# Patient Record
Sex: FEMALE | Race: BLACK OR AFRICAN AMERICAN | HISPANIC OR LATINO | Employment: OTHER | URBAN - METROPOLITAN AREA
[De-identification: names, ages, dates, MRNs, and addresses within clinical notes are randomized per-mention and may not be internally consistent; named-entity substitution may affect disease eponyms.]

---

## 2022-07-18 ENCOUNTER — OFFICE VISIT (OUTPATIENT)
Dept: INTERNAL MEDICINE CLINIC | Facility: CLINIC | Age: 47
End: 2022-07-18

## 2022-07-18 ENCOUNTER — APPOINTMENT (OUTPATIENT)
Dept: LAB | Facility: CLINIC | Age: 47
End: 2022-07-18

## 2022-07-18 VITALS
HEIGHT: 66 IN | TEMPERATURE: 97.1 F | DIASTOLIC BLOOD PRESSURE: 82 MMHG | HEART RATE: 72 BPM | OXYGEN SATURATION: 99 % | WEIGHT: 224.6 LBS | BODY MASS INDEX: 36.1 KG/M2 | SYSTOLIC BLOOD PRESSURE: 120 MMHG

## 2022-07-18 DIAGNOSIS — N83.201 RIGHT OVARIAN CYST: ICD-10-CM

## 2022-07-18 DIAGNOSIS — Z00.00 ANNUAL PHYSICAL EXAM: Primary | ICD-10-CM

## 2022-07-18 DIAGNOSIS — K43.9 VENTRAL HERNIA WITHOUT OBSTRUCTION OR GANGRENE: ICD-10-CM

## 2022-07-18 DIAGNOSIS — R60.9 DEPENDENT EDEMA: ICD-10-CM

## 2022-07-18 DIAGNOSIS — Z00.00 ANNUAL PHYSICAL EXAM: ICD-10-CM

## 2022-07-18 PROBLEM — Z11.59 NEED FOR HEPATITIS C SCREENING TEST: Status: ACTIVE | Noted: 2022-07-18

## 2022-07-18 LAB
EST. AVERAGE GLUCOSE BLD GHB EST-MCNC: 154 MG/DL
HBA1C MFR BLD: 7 %
TSH SERPL DL<=0.05 MIU/L-ACNC: 1.33 UIU/ML (ref 0.45–4.5)

## 2022-07-18 PROCEDURE — 84443 ASSAY THYROID STIM HORMONE: CPT

## 2022-07-18 PROCEDURE — 36415 COLL VENOUS BLD VENIPUNCTURE: CPT

## 2022-07-18 PROCEDURE — 83036 HEMOGLOBIN GLYCOSYLATED A1C: CPT

## 2022-07-18 PROCEDURE — 99204 OFFICE O/P NEW MOD 45 MIN: CPT | Performed by: FAMILY MEDICINE

## 2022-07-18 NOTE — PATIENT INSTRUCTIONS

## 2022-07-18 NOTE — PROGRESS NOTES
ADULT ANNUAL [address]     NAME: Mychal Bowers  AGE: 51 y o  SEX: female  : 1975     DATE: 2022     Assessment and Plan:     Problem List Items Addressed This Visit    None     Visit Diagnoses     Annual physical exam    -  Primary    Relevant Orders    HEMOGLOBIN A1C W/ EAG ESTIMATION    TSH, 3rd generation with Free T4 reflex    Ventral hernia without obstruction or gangrene        Right ovarian cyst        Dependent edema            50-year-old female due for multiple screening studies and follow up care for ovarian cyst and ventral hernia  Limited medical care due to limited  medical insurance coverage  Patient encouraged to apply for [place] medical assistance  Until then will follow-up on elevated blood sugars and thyroid dyscrasia rule out etiology for lower extremity swelling LE  likely due to dependent edema  Encouraged patient to elevate legs at the end of work day and wear compression stockings as well  Counseling:  Dental Health: discussed importance of regular tooth brushing, flossing, and dental visits  · Exercise: the importance of regular exercise/physical activity was discussed  Recommend exercise 3-5 times per week for at least 30 minutes  No follow-ups on file  Chief Complaint:     Chief Complaint   Patient presents with    Physical Exam     Pt is here to establish care and also has swelling on both of her feet since 2 days and off and off      History of Present Illness:     Adult Annual Physical   Patient here for a comprehensive physical exam   Presents to establish care  Denies chronic medical history  Does report ER visit last year with a CT scan that shows ovarian cyst and a ventral hernia  Patient was unable to follow-up concerning things due to lack of medical insurance  She also notes having a history of elevated blood sugars  Would like this checked      Works in housekeeping   Lives with  and kids  Denies family hx of breast cancer or colon cancer     Diet and Physical Activity  · Diet/Nutrition: poor diet  · Exercise: no formal exercise  Depression Screening  PHQ-2/9 Depression Screening    Little interest or pleasure in doing things: 0 - not at all  Feeling down, depressed, or hopeless: 0 - not at all  PHQ-2 Score: 0  PHQ-2 Interpretation: Negative depression screen       General Health  · Sleep: gets 4-6 hours of sleep on average  · Hearing: normal - bilateral   · Vision: no vision problems  · Dental: no dental visits for >1 year  /GYN Health  · Patient is: premenopausal  · Last menstrual period: 2021  · Contraceptive method: none   · Last mammoo- never  Last pap- years     Review of Systems:     Review of Systems   Eyes: Negative for visual disturbance  Respiratory: Negative for shortness of breath  Cardiovascular: Positive for leg swelling (resolved by morning)  Negative for chest pain  Gastrointestinal: Negative for constipation and diarrhea  Musculoskeletal: Negative for gait problem  Skin: Negative for rash  Allergic/Immunologic: Negative for environmental allergies  Past Medical History:     Past Medical History:   Diagnosis Date    gallbladder       Past Surgical History:     Past Surgical History:   Procedure Laterality Date     SECTION      GALLBLADDER SURGERY        Social History:     Social History     Socioeconomic History    Marital status: /Civil Union     Spouse name: None    Number of children: None    Years of education: None    Highest education level: None   Occupational History    None   Tobacco Use    Smoking status: Never Smoker    Smokeless tobacco: None   Vaping Use    Vaping Use: Never used   Substance and Sexual Activity    Alcohol use:  Yes     Alcohol/week: 1 0 standard drink     Types: 1 Glasses of wine per week     Comment: social    Drug use: None    Sexual activity: None   Other Topics Concern    None   Social History Narrative    None     Social Determinants of Health     Financial Resource Strain: Not on file   Food Insecurity: Not on file   Transportation Needs: Not on file   Physical Activity: Not on file   Stress: Not on file   Social Connections: Not on file   Intimate Partner Violence: Not on file   Housing Stability: Not on file      Family History:     Family History   Problem Relation Age of Onset    Diabetes Sister       Current Medications:     No current outpatient medications on file  No current facility-administered medications for this visit  Allergies:     No Known Allergies   Physical Exam:     /82 (BP Location: Left arm, Patient Position: Sitting, Cuff Size: Standard)   Pulse 72   Temp (!) 97 1 °F (36 2 °C) (Temporal)   Ht 5' 6" (1 676 m)   Wt 102 kg (224 lb 9 6 oz)   SpO2 99%   BMI 36 25 kg/m²     Physical Exam  Constitutional:       Appearance: She is obese  HENT:      Head: Normocephalic and atraumatic  Right Ear: Tympanic membrane and external ear normal       Left Ear: Tympanic membrane and external ear normal    Eyes:      Extraocular Movements: Extraocular movements intact  Conjunctiva/sclera: Conjunctivae normal       Pupils: Pupils are equal, round, and reactive to light  Cardiovascular:      Rate and Rhythm: Normal rate and regular rhythm  Heart sounds: No murmur heard  Pulmonary:      Effort: Pulmonary effort is normal       Breath sounds: Normal breath sounds  Abdominal:      General: Bowel sounds are normal       Palpations: Abdomen is soft  Musculoskeletal:      Right lower leg: No edema  Left lower leg: No edema  Neurological:      Mental Status: She is alert and oriented to person, place, and time        Gait: Gait normal    Psychiatric:         Mood and Affect: Mood normal          Behavior: Behavior normal           Dianne Cooney,   MEDICAL [id number]

## 2022-07-19 ENCOUNTER — TELEPHONE (OUTPATIENT)
Dept: INTERNAL MEDICINE CLINIC | Facility: CLINIC | Age: 47
End: 2022-07-19

## 2022-07-19 DIAGNOSIS — E11.9 TYPE 2 DIABETES MELLITUS WITHOUT COMPLICATION, WITHOUT LONG-TERM CURRENT USE OF INSULIN (HCC): Primary | ICD-10-CM

## 2022-07-19 NOTE — TELEPHONE ENCOUNTER
I spoke to pt she is aware of her results she also would like to know that when she had a high sugar she was able to control it on her own she would like to know if this is ok with you before she starts this with out medicine and she is also going to buy a glucometer to check her sugar at home patient needs advise on what to do

## 2022-07-19 NOTE — TELEPHONE ENCOUNTER
----- Message from Kimmy Galan DO sent at 7/19/2022  9:35 AM EDT -----  Please notify patient that her thyroid function is normal however her A1c is 7 0  This means that she does have diabetes  I do recommend starting medication like metformin 500 mg once a day  This medication is cheap specially obtained at [address] [facility] [place]    Please see if patient is amendable to starting medication

## 2022-07-19 NOTE — TELEPHONE ENCOUNTER
Please notify patient it is feasible to try lifestyle modification  as a way to manage her diabetes  Will recheck A1c in 3 months to ensure that it has improved   if not I would strongly recommend starting metformin therapy

## 2022-07-20 ENCOUNTER — TELEPHONE (OUTPATIENT)
Dept: INTERNAL MEDICINE CLINIC | Facility: CLINIC | Age: 47
End: 2022-07-20

## 2022-07-20 RX ORDER — METFORMIN HYDROCHLORIDE 500 MG/1
500 TABLET, EXTENDED RELEASE ORAL
Qty: 30 TABLET | Refills: 2 | Status: SHIPPED | OUTPATIENT
Start: 2022-07-20

## 2022-07-20 NOTE — TELEPHONE ENCOUNTER
Pt would like to start metformin rx  Please send rx to [address] in [address] [place] [address], call back upon completion

## 2022-10-11 PROBLEM — Z11.59 NEED FOR HEPATITIS C SCREENING TEST: Status: RESOLVED | Noted: 2022-07-18 | Resolved: 2022-10-11

## 2022-11-01 DIAGNOSIS — E11.9 TYPE 2 DIABETES MELLITUS WITHOUT COMPLICATION, WITHOUT LONG-TERM CURRENT USE OF INSULIN (HCC): ICD-10-CM

## 2022-11-01 RX ORDER — METFORMIN HYDROCHLORIDE 500 MG/1
500 TABLET, EXTENDED RELEASE ORAL
Qty: 30 TABLET | Refills: 2 | Status: SHIPPED | OUTPATIENT
Start: 2022-11-01

## 2022-12-28 DIAGNOSIS — E11.9 TYPE 2 DIABETES MELLITUS WITHOUT COMPLICATION, WITHOUT LONG-TERM CURRENT USE OF INSULIN (HCC): ICD-10-CM

## 2022-12-28 RX ORDER — METFORMIN HYDROCHLORIDE 500 MG/1
TABLET, EXTENDED RELEASE ORAL
Qty: 30 TABLET | Refills: 2 | Status: SHIPPED | OUTPATIENT
Start: 2022-12-28

## 2023-05-08 DIAGNOSIS — E11.9 TYPE 2 DIABETES MELLITUS WITHOUT COMPLICATION, WITHOUT LONG-TERM CURRENT USE OF INSULIN (HCC): ICD-10-CM

## 2023-05-08 RX ORDER — METFORMIN HYDROCHLORIDE 500 MG/1
TABLET, EXTENDED RELEASE ORAL
Qty: 30 TABLET | Refills: 0 | Status: SHIPPED | OUTPATIENT
Start: 2023-05-08

## 2023-07-16 ENCOUNTER — TELEPHONE (OUTPATIENT)
Dept: OTHER | Facility: OTHER | Age: 48
End: 2023-07-16

## 2023-08-13 DIAGNOSIS — E11.9 TYPE 2 DIABETES MELLITUS WITHOUT COMPLICATION, WITHOUT LONG-TERM CURRENT USE OF INSULIN (HCC): ICD-10-CM

## 2023-08-14 RX ORDER — METFORMIN HYDROCHLORIDE 500 MG/1
TABLET, EXTENDED RELEASE ORAL
Qty: 30 TABLET | Refills: 0 | Status: SHIPPED | OUTPATIENT
Start: 2023-08-14

## 2023-08-28 ENCOUNTER — TELEPHONE (OUTPATIENT)
Age: 48
End: 2023-08-28

## 2023-08-28 NOTE — TELEPHONE ENCOUNTER
Spoke with patient regarding her diabetic eye exam. Patient is not sure what the name of her office is and will look it up and ask them to send over results to our office.

## 2023-09-15 DIAGNOSIS — E11.9 TYPE 2 DIABETES MELLITUS WITHOUT COMPLICATION, WITHOUT LONG-TERM CURRENT USE OF INSULIN (HCC): ICD-10-CM

## 2023-09-15 RX ORDER — METFORMIN HYDROCHLORIDE 500 MG/1
TABLET, EXTENDED RELEASE ORAL
Qty: 90 TABLET | Refills: 1 | OUTPATIENT
Start: 2023-09-15

## 2024-02-19 ENCOUNTER — TELEPHONE (OUTPATIENT)
Age: 49
End: 2024-02-19

## 2024-02-19 NOTE — TELEPHONE ENCOUNTER
Left detailed voice mssg for pt to give the office a call back to schedule follow up appt/ annual appt. She is also due for iris exam per quality request.

## 2024-02-20 ENCOUNTER — TELEPHONE (OUTPATIENT)
Age: 49
End: 2024-02-20

## 2024-08-05 ENCOUNTER — OFFICE VISIT (OUTPATIENT)
Age: 49
End: 2024-08-05

## 2024-08-05 ENCOUNTER — APPOINTMENT (OUTPATIENT)
Age: 49
End: 2024-08-05
Payer: COMMERCIAL

## 2024-08-05 VITALS
BODY MASS INDEX: 36.45 KG/M2 | SYSTOLIC BLOOD PRESSURE: 132 MMHG | HEART RATE: 75 BPM | DIASTOLIC BLOOD PRESSURE: 86 MMHG | HEIGHT: 66 IN | WEIGHT: 226.8 LBS | OXYGEN SATURATION: 98 % | TEMPERATURE: 97.8 F | RESPIRATION RATE: 16 BRPM

## 2024-08-05 DIAGNOSIS — Z12.11 SCREENING FOR COLON CANCER: ICD-10-CM

## 2024-08-05 DIAGNOSIS — E66.09 CLASS 2 OBESITY DUE TO EXCESS CALORIES WITHOUT SERIOUS COMORBIDITY WITH BODY MASS INDEX (BMI) OF 36.0 TO 36.9 IN ADULT: ICD-10-CM

## 2024-08-05 DIAGNOSIS — R03.0 ELEVATED BP WITHOUT DIAGNOSIS OF HYPERTENSION: ICD-10-CM

## 2024-08-05 DIAGNOSIS — R73.01 IMPAIRED FASTING GLUCOSE: ICD-10-CM

## 2024-08-05 DIAGNOSIS — Z59.9 FINANCIAL DIFFICULTIES: ICD-10-CM

## 2024-08-05 DIAGNOSIS — Z00.00 ENCOUNTER FOR ANNUAL PHYSICAL EXAM: Primary | ICD-10-CM

## 2024-08-05 DIAGNOSIS — B35.1 ONYCHOMYCOSIS OF LEFT GREAT TOE: ICD-10-CM

## 2024-08-05 DIAGNOSIS — Z12.31 ENCOUNTER FOR SCREENING MAMMOGRAM FOR BREAST CANCER: ICD-10-CM

## 2024-08-05 DIAGNOSIS — R53.83 FATIGUE, UNSPECIFIED TYPE: ICD-10-CM

## 2024-08-05 LAB
ANION GAP SERPL CALCULATED.3IONS-SCNC: 10 MMOL/L (ref 4–13)
BUN SERPL-MCNC: 11 MG/DL (ref 5–25)
CALCIUM SERPL-MCNC: 8.8 MG/DL (ref 8.4–10.2)
CHLORIDE SERPL-SCNC: 103 MMOL/L (ref 96–108)
CHOLEST SERPL-MCNC: 159 MG/DL
CO2 SERPL-SCNC: 26 MMOL/L (ref 21–32)
CREAT SERPL-MCNC: 0.76 MG/DL (ref 0.6–1.3)
EST. AVERAGE GLUCOSE BLD GHB EST-MCNC: 212 MG/DL
GFR SERPL CREATININE-BSD FRML MDRD: 92 ML/MIN/1.73SQ M
GLUCOSE P FAST SERPL-MCNC: 134 MG/DL (ref 65–99)
HBA1C MFR BLD: 9 %
HDLC SERPL-MCNC: 57 MG/DL
LDLC SERPL CALC-MCNC: 76 MG/DL (ref 0–100)
NONHDLC SERPL-MCNC: 102 MG/DL
POTASSIUM SERPL-SCNC: 3.6 MMOL/L (ref 3.5–5.3)
SODIUM SERPL-SCNC: 139 MMOL/L (ref 135–147)
TRIGL SERPL-MCNC: 130 MG/DL

## 2024-08-05 PROCEDURE — 83036 HEMOGLOBIN GLYCOSYLATED A1C: CPT

## 2024-08-05 PROCEDURE — 80061 LIPID PANEL: CPT

## 2024-08-05 PROCEDURE — 99204 OFFICE O/P NEW MOD 45 MIN: CPT | Performed by: STUDENT IN AN ORGANIZED HEALTH CARE EDUCATION/TRAINING PROGRAM

## 2024-08-05 PROCEDURE — 80048 BASIC METABOLIC PNL TOTAL CA: CPT

## 2024-08-05 PROCEDURE — 36415 COLL VENOUS BLD VENIPUNCTURE: CPT

## 2024-08-05 RX ORDER — METFORMIN HYDROCHLORIDE 500 MG/1
500 TABLET, EXTENDED RELEASE ORAL
Qty: 30 TABLET | Refills: 1 | Status: SHIPPED | OUTPATIENT
Start: 2024-08-05 | End: 2024-08-06

## 2024-08-05 RX ORDER — CETIRIZINE HYDROCHLORIDE 10 MG/1
10 TABLET ORAL DAILY
COMMUNITY
Start: 2024-02-08 | End: 2024-08-05 | Stop reason: ALTCHOICE

## 2024-08-05 SDOH — ECONOMIC STABILITY - INCOME SECURITY: PROBLEM RELATED TO HOUSING AND ECONOMIC CIRCUMSTANCES, UNSPECIFIED: Z59.9

## 2024-08-05 NOTE — PATIENT INSTRUCTIONS
Please check your blood pressure every day/every other day. If you are consistently above 130/80 please give me a call to discuss starting medication.    Please get your lab work done.

## 2024-08-05 NOTE — PROGRESS NOTES
Adult Annual Physical  Name: Rizwana Little      : 1975      MRN: [id number]  Encounter Provider: Tong Jaime MD  Encounter Date: 2024   Encounter department: formerly [facility] [place]    Assessment & Plan   1. Encounter for annual physical exam  Immunizations: Deferred at this time due to patient preference  Labs: Lab orders limited 2/2 to lack of insurance coverage; will check BMP, A1c, lipids - patient will let us know if she obtains insurance coverage to add on additional labs  Screening: Referral to GI for colonoscopy; mammogram ordered - patient to defer until insurance coverage; deferred Hep C and HIV screening at this time 2/2 cost    2. Impaired fasting glucose  Patient had A1c of 7.0 2 years ago and was given T2DM diagnosis. However, she only met criteria with one lab. Fasting glucose at that time in prediabetic range.  Recheck FBG, A1c  Restart metformin 500 mg qd  -     Basic metabolic panel; Future  -     metFORMIN (GLUCOPHAGE-XR) 500 mg 24 hr tablet; Take 1 tablet (500 mg total) by mouth daily with dinner  -     Hemoglobin A1C; Future    3. Elevated BP without diagnosis of hypertension  /86 without symptoms.  Instructed patient to check BP every day/every other day for next 2 weeks and call back with results, especially if consistently above 130/80    4. Class 2 obesity due to excess calories without serious comorbidity with body mass index (BMI) of 36.0 to 36.9 in adult  -     Lipid panel; Future    5. Onychomycosis of left great toe  Can continue topical OTC antifungal    6. Encounter for screening mammogram for breast cancer  -     Mammo screening bilateral w 3d & cad; Future; Expected date: 2024    7. Screening for colon cancer  -     Ambulatory Referral to Gastroenterology; Future    8. Financial difficulties  Patient working on insurance application. Will defer additional workup until insurance coverage.    9. Fatigue, unspecified type  STOP BANG 3  with concern for MARITA based on history of symptoms.  When insurance coverage obtained, will plan to check CBC, TSH and obtain sleep study    Immunizations and preventive care screenings were discussed with patient today. Appropriate education was printed on patient's after visit summary.    Counseling:  Alcohol/drug use: discussed moderation in alcohol intake, the recommendations for healthy alcohol use, and avoidance of illicit drug use.  Dental Health: discussed importance of regular tooth brushing, flossing, and dental visits.  Injury prevention: discussed safety/seat belts, safety helmets, smoke detectors, carbon dioxide detectors, and smoking near bedding or upholstery.  Sexual health: discussed sexually transmitted diseases, partner selection, use of condoms, avoidance of unintended pregnancy, and contraceptive alternatives.  Exercise: the importance of regular exercise/physical activity was discussed. Recommend exercise 3-5 times per week for at least 30 minutes.       Depression Screening and Follow-up Plan: Patient was screened for depression during today's encounter. They screened negative with a PHQ-2 score of 0.        History of Present Illness       Rizwana Little is a 47 yo F with PMH of impaired fasting glucose who presents today for annual physical.  She was last seen in our office 2 years ago.  At that time she had an A1c done which returned at 7.0.  She did not meet any other criteria for diagnosis of diabetes though she was told at times that she had type 2 diabetes and was started on metformin.  At that time, patient did not have insurance and so her workup was limited.  Due to lack of insurance, she has not followed up and has been out of metformin for some time.  She presents today again as self-pay and is in the process of trying to get her health insurance approved.  Given lack of insurance coverage, workup limited again at this time.  She denies any new complaints though upon further  "questioning it is noted that she only sleeps about 3 to 4 hours per night with frequent awakenings.  She does snore but her  who is present in the room denies any witnessed apnea.  She feels unrested in the morning and often wakes up with morning headache.STOP BANG 3.    Adult Annual Physical:  Patient presents for annual physical.     Diet and Physical Activity:  - Diet/Nutrition:. \"So-so\" diet but trying to cut down on sugar intake and fatty foods  - Exercise: walking and 5-7 times a week on average.    Depression Screening:  - PHQ-2 Score: 0    General Health:  - Sleep: sleeps poorly. 3-4 hours of sleep on average  - Hearing: normal hearing right ear and normal hearing left ear.  - Vision: goes for regular eye exams and wears glasses and contacts.  - Dental: no dental visits for > 1 year and brushes teeth once daily.    /GYN Health:  - Follows with GYN: no.   - Menopause: premenopausal.   - Last menstrual cycle: 8/3/2024.   - History of STDs: no  - Contraception:. Not sexually active      Advanced Care Planning:  - Has an advanced directive?: no    - Has a durable medical POA?: no    - ACP document given to patient?: no      Review of Systems   Constitutional:  Positive for fatigue. Negative for chills and fever.   HENT:  Negative for congestion and sore throat.    Eyes:  Negative for visual disturbance.   Respiratory:  Negative for cough and shortness of breath.    Cardiovascular:  Negative for chest pain and leg swelling.   Gastrointestinal:  Negative for abdominal pain, constipation, diarrhea and nausea.   Genitourinary:  Negative for dysuria.   Musculoskeletal:  Negative for arthralgias and myalgias.   Neurological:  Negative for dizziness, light-headedness and headaches.   Psychiatric/Behavioral:  Positive for sleep disturbance. Negative for confusion.      Medical History Reviewed by provider this encounter:       Past Medical History   Past Medical History:   Diagnosis Date    gallbladder  " "    Past Surgical History:   Procedure Laterality Date     SECTION      GALLBLADDER SURGERY       Family History   Problem Relation Age of Onset    Diabetes Sister      Current Outpatient Medications on File Prior to Visit   Medication Sig Dispense Refill    [DISCONTINUED] metFORMIN (GLUCOPHAGE-XR) 500 mg 24 hr tablet TAKE 1 TABLET BY MOUTH EVERY DAY WITH DINNER 30 tablet 0    [DISCONTINUED] cetirizine (ZyrTEC Allergy) 10 mg tablet Take 10 mg by mouth daily (Patient not taking: Reported on 2024)       No current facility-administered medications on file prior to visit.   No Known Allergies   Current Outpatient Medications on File Prior to Visit   Medication Sig Dispense Refill    [DISCONTINUED] metFORMIN (GLUCOPHAGE-XR) 500 mg 24 hr tablet TAKE 1 TABLET BY MOUTH EVERY DAY WITH DINNER 30 tablet 0    [DISCONTINUED] cetirizine (ZyrTEC Allergy) 10 mg tablet Take 10 mg by mouth daily (Patient not taking: Reported on 2024)       No current facility-administered medications on file prior to visit.      Social History     Tobacco Use    Smoking status: Never    Smokeless tobacco: Not on file   Vaping Use    Vaping status: Never Used   Substance and Sexual Activity    Alcohol use: Yes     Alcohol/week: 1.0 standard drink of alcohol     Types: 1 Glasses of wine per week     Comment: social    Drug use: Not on file    Sexual activity: Not on file       Objective     /86 (BP Location: Right arm, Patient Position: Sitting, Cuff Size: Large)   Pulse 75   Temp 97.8 °F (36.6 °C) (Tympanic)   Resp 16   Ht 5' 6\" (1.676 m)   Wt 103 kg (226 lb 12.8 oz)   SpO2 98%   BMI 36.61 kg/m²     Physical Exam  Constitutional:       General: She is not in acute distress.  HENT:      Right Ear: Tympanic membrane, ear canal and external ear normal.      Left Ear: Tympanic membrane, ear canal and external ear normal.      Nose: Nose normal.      Mouth/Throat:      Mouth: Mucous membranes are moist.      Pharynx: " Oropharynx is clear.   Eyes:      Conjunctiva/sclera: Conjunctivae normal.      Pupils: Pupils are equal, round, and reactive to light.   Neck:      Thyroid: No thyroid mass or thyroid tenderness.   Cardiovascular:      Rate and Rhythm: Normal rate and regular rhythm.      Heart sounds: Normal heart sounds.   Pulmonary:      Effort: Pulmonary effort is normal.      Breath sounds: Normal breath sounds.   Abdominal:      General: There is no distension.      Tenderness: There is no abdominal tenderness.   Musculoskeletal:      Right lower leg: No edema.      Left lower leg: No edema.   Feet:      Comments: Onychomycosis of left great toe  Lymphadenopathy:      Cervical: No cervical adenopathy.   Skin:     General: Skin is warm and dry.   Neurological:      Mental Status: She is alert.      Sensory: Sensation is intact.      Motor: Motor function is intact.      Gait: Gait is intact.   Psychiatric:         Mood and Affect: Mood normal.         Speech: Speech normal.         Behavior: Behavior normal. Behavior is cooperative.

## 2024-08-06 DIAGNOSIS — E11.9 TYPE 2 DIABETES MELLITUS WITHOUT COMPLICATION, WITHOUT LONG-TERM CURRENT USE OF INSULIN (HCC): Primary | ICD-10-CM

## 2024-08-06 DIAGNOSIS — R73.01 IMPAIRED FASTING GLUCOSE: ICD-10-CM

## 2024-08-06 RX ORDER — ATORVASTATIN CALCIUM 20 MG/1
20 TABLET, FILM COATED ORAL
Qty: 30 TABLET | Refills: 1 | Status: SHIPPED | OUTPATIENT
Start: 2024-08-06 | End: 2024-09-05

## 2024-08-06 RX ORDER — METFORMIN HYDROCHLORIDE 500 MG/1
500 TABLET, EXTENDED RELEASE ORAL 2 TIMES DAILY WITH MEALS
Qty: 30 TABLET | Refills: 1 | Status: SHIPPED | OUTPATIENT
Start: 2024-08-06

## 2024-08-08 ENCOUNTER — TELEPHONE (OUTPATIENT)
Age: 49
End: 2024-08-08

## 2024-08-08 NOTE — TELEPHONE ENCOUNTER
Spoke with patient. Discussed plan of care. She will continue metformin as we discussed. She will work on dietary modification. If in 1-2 weeks, she is not feeling well with high blood sugars, we may discuss addition of another oral medication. I would like her to come in sooner than her originally scheduled appointment. Will ask clerical to have her scheduled for 1 month follow up.

## 2024-08-08 NOTE — TELEPHONE ENCOUNTER
Pt called, has concerns of her Glucose readings  This morning Reading 299  Reading - Afternoon- 294  Has headaches and very low energy.    She wants to know what she can do to bring down the sugar .    Nurse line not available.    Pt is requesting a call back ASAP from Dr Jaime      Please advise. Thank you

## 2024-08-19 DIAGNOSIS — R73.01 IMPAIRED FASTING GLUCOSE: ICD-10-CM

## 2024-08-20 RX ORDER — METFORMIN HCL 500 MG
500 TABLET, EXTENDED RELEASE 24 HR ORAL 2 TIMES DAILY WITH MEALS
Qty: 180 TABLET | Refills: 1 | Status: SHIPPED | OUTPATIENT
Start: 2024-08-20

## 2024-08-22 ENCOUNTER — TELEPHONE (OUTPATIENT)
Age: 49
End: 2024-08-22

## 2024-08-22 NOTE — TELEPHONE ENCOUNTER
Pt. Called, had a missed call regarding scheduling colonoscopy, warm transfer to GI scheduling to better assist, call transferred to Miesha

## 2024-08-22 NOTE — TELEPHONE ENCOUNTER
Pt calling to sched colonoscopy. Will call back to sched, pt does not have insurance and would like to look into cost prior to sched proced. If too pricey, pt will wait until she gets insurance.

## 2024-09-03 DIAGNOSIS — E11.9 TYPE 2 DIABETES MELLITUS WITHOUT COMPLICATION, WITHOUT LONG-TERM CURRENT USE OF INSULIN (HCC): ICD-10-CM

## 2024-09-04 RX ORDER — ATORVASTATIN CALCIUM 20 MG/1
20 TABLET, FILM COATED ORAL
Qty: 90 TABLET | Refills: 1 | Status: SHIPPED | OUTPATIENT
Start: 2024-09-04 | End: 2024-09-06 | Stop reason: CLARIF

## 2024-09-06 ENCOUNTER — APPOINTMENT (OUTPATIENT)
Age: 49
End: 2024-09-06
Payer: COMMERCIAL

## 2024-09-06 ENCOUNTER — OFFICE VISIT (OUTPATIENT)
Age: 49
End: 2024-09-06

## 2024-09-06 VITALS
WEIGHT: 227.8 LBS | HEIGHT: 66 IN | TEMPERATURE: 97.2 F | DIASTOLIC BLOOD PRESSURE: 88 MMHG | RESPIRATION RATE: 16 BRPM | BODY MASS INDEX: 36.61 KG/M2 | HEART RATE: 71 BPM | SYSTOLIC BLOOD PRESSURE: 134 MMHG | OXYGEN SATURATION: 99 %

## 2024-09-06 DIAGNOSIS — I10 PRIMARY HYPERTENSION: ICD-10-CM

## 2024-09-06 DIAGNOSIS — E11.9 TYPE 2 DIABETES MELLITUS WITHOUT COMPLICATION, WITHOUT LONG-TERM CURRENT USE OF INSULIN (HCC): Primary | ICD-10-CM

## 2024-09-06 DIAGNOSIS — Z59.89 DOES NOT HAVE HEALTH INSURANCE: ICD-10-CM

## 2024-09-06 DIAGNOSIS — E11.9 TYPE 2 DIABETES MELLITUS WITHOUT COMPLICATION, WITHOUT LONG-TERM CURRENT USE OF INSULIN (HCC): ICD-10-CM

## 2024-09-06 PROBLEM — R03.0 ELEVATED BP WITHOUT DIAGNOSIS OF HYPERTENSION: Status: RESOLVED | Noted: 2024-08-05 | Resolved: 2024-09-06

## 2024-09-06 PROBLEM — R73.01 IMPAIRED FASTING GLUCOSE: Status: RESOLVED | Noted: 2022-07-19 | Resolved: 2024-09-06

## 2024-09-06 LAB
BASOPHILS # BLD AUTO: 0.06 THOUSANDS/ÂΜL (ref 0–0.1)
BASOPHILS NFR BLD AUTO: 1 % (ref 0–1)
EOSINOPHIL # BLD AUTO: 0.33 THOUSAND/ÂΜL (ref 0–0.61)
EOSINOPHIL NFR BLD AUTO: 3 % (ref 0–6)
ERYTHROCYTE [DISTWIDTH] IN BLOOD BY AUTOMATED COUNT: 14.7 % (ref 11.6–15.1)
HCT VFR BLD AUTO: 35.4 % (ref 34.8–46.1)
HGB BLD-MCNC: 10.7 G/DL (ref 11.5–15.4)
IMM GRANULOCYTES # BLD AUTO: 0.05 THOUSAND/UL (ref 0–0.2)
IMM GRANULOCYTES NFR BLD AUTO: 0 % (ref 0–2)
LYMPHOCYTES # BLD AUTO: 3.89 THOUSANDS/ÂΜL (ref 0.6–4.47)
LYMPHOCYTES NFR BLD AUTO: 33 % (ref 14–44)
MCH RBC QN AUTO: 25.2 PG (ref 26.8–34.3)
MCHC RBC AUTO-ENTMCNC: 30.2 G/DL (ref 31.4–37.4)
MCV RBC AUTO: 84 FL (ref 82–98)
MONOCYTES # BLD AUTO: 0.73 THOUSAND/ÂΜL (ref 0.17–1.22)
MONOCYTES NFR BLD AUTO: 6 % (ref 4–12)
NEUTROPHILS # BLD AUTO: 6.81 THOUSANDS/ÂΜL (ref 1.85–7.62)
NEUTS SEG NFR BLD AUTO: 57 % (ref 43–75)
NRBC BLD AUTO-RTO: 0 /100 WBCS
PLATELET # BLD AUTO: 291 THOUSANDS/UL (ref 149–390)
PMV BLD AUTO: 11.9 FL (ref 8.9–12.7)
RBC # BLD AUTO: 4.24 MILLION/UL (ref 3.81–5.12)
TSH SERPL DL<=0.05 MIU/L-ACNC: 1.3 UIU/ML (ref 0.45–4.5)
WBC # BLD AUTO: 11.87 THOUSAND/UL (ref 4.31–10.16)

## 2024-09-06 PROCEDURE — 99214 OFFICE O/P EST MOD 30 MIN: CPT | Performed by: STUDENT IN AN ORGANIZED HEALTH CARE EDUCATION/TRAINING PROGRAM

## 2024-09-06 PROCEDURE — 84443 ASSAY THYROID STIM HORMONE: CPT

## 2024-09-06 PROCEDURE — 85025 COMPLETE CBC W/AUTO DIFF WBC: CPT

## 2024-09-06 PROCEDURE — 36415 COLL VENOUS BLD VENIPUNCTURE: CPT

## 2024-09-06 RX ORDER — LISINOPRIL 10 MG/1
10 TABLET ORAL DAILY
Qty: 100 TABLET | Refills: 3 | Status: SHIPPED | OUTPATIENT
Start: 2024-09-06

## 2024-09-06 RX ORDER — DIPHENOXYLATE HYDROCHLORIDE AND ATROPINE SULFATE 2.5; .025 MG/1; MG/1
1 TABLET ORAL DAILY
COMMUNITY

## 2024-09-06 RX ORDER — ROSUVASTATIN CALCIUM 5 MG/1
5 TABLET, COATED ORAL DAILY
Qty: 30 TABLET | Refills: 5 | Status: SHIPPED | OUTPATIENT
Start: 2024-09-06

## 2024-09-06 SDOH — ECONOMIC STABILITY - INCOME SECURITY: OTHER PROBLEMS RELATED TO HOUSING AND ECONOMIC CIRCUMSTANCES: Z59.89

## 2024-09-06 NOTE — PROGRESS NOTES
Ambulatory Visit  Name: Rizwana Little      : 1975      MRN: [id number]  Encounter Provider: Tong Jaime MD  Encounter Date: 2024   Encounter department: [facility]IMARY C[facility] [place]    Assessment & Plan   1. Type 2 diabetes mellitus without complication, without long-term current use of insulin (HCC)  A1c recently 9.0. Started on metformin 500 mg BID, which she is tolerating well.  Continue metformin at current dose  Repeat A1c at follow up visit in 2 months  Foot exam completed today  Diabetic eye exam, diabetes education referral, and microalbumin/Cr ratio deferred today 2/2 patient lacking health insurance coverage  Complaining of muscle aching since starting atorvastatin - discontinue atorvastatin, start rosuvastatin 5 mg qd  -     TSH, 3rd generation with Free T4 reflex; Future  -     rosuvastatin (CRESTOR) 5 mg tablet; Take 1 tablet (5 mg total) by mouth daily    2. Primary hypertension  Reports readings at home as high as 150s.  Start lisinopril 10 mg qd  -     CBC and differential; Future  -     TSH, 3rd generation with Free T4 reflex; Future  -     lisinopril (ZESTRIL) 10 mg tablet; Take 1 tablet (10 mg total) by mouth daily    3. Does not have health insurance  After discussion with patient, the following to be deferred until after health insurance coverage obtained: diabetic eye exam, diabetes education referral, microalbumin/Cr ratio, mammogram, colonoscopy, cervical cancer screening, HIV screening, hep C screening.   Mammogram and GI referral ordered at last office visit.      Depression Screening and Follow-up Plan: Patient was screened for depression during today's encounter. They screened negative with a PHQ-2 score of 0.      History of Present Illness     Rizwana Little is a 47 yo F with PMH of recently diagnosed T2DM who presents today for follow up. Her recent labs showed A1c 9.0. She was started on metformin 500 mg BID as well as moderate intensity statin.  Full lab panel was deferred as she is still awaiting insurance coverage. She reports she has been tolerating metformin well. She has noticed some muscle aching however, around the same time that she started atorvastatin. She was checking her BP at home sporadically but states she gets readings into the SBP 150s. She was ordered mammogram and GI referral at last office visit but she is waiting until she gets insurance set up. She would like to hold off on diabetic eye exam and diabetes education as well until then.        Review of Systems   Constitutional:  Negative for appetite change, chills and fever.   Eyes:  Negative for visual disturbance.   Respiratory:  Negative for shortness of breath.    Cardiovascular:  Negative for chest pain and leg swelling.   Gastrointestinal:  Negative for abdominal pain.   Musculoskeletal:  Positive for myalgias. Negative for arthralgias.   Neurological:  Negative for dizziness, light-headedness and headaches.     Medical History Reviewed by provider this encounter:  Meds  Problems       Past Medical History   Past Medical History:   Diagnosis Date    Elevated BP without diagnosis of hypertension 2024    gallbladder     Impaired fasting glucose 2022     Past Surgical History:   Procedure Laterality Date     SECTION      GALLBLADDER SURGERY       Family History   Problem Relation Age of Onset    Diabetes Sister      Current Outpatient Medications on File Prior to Visit   Medication Sig Dispense Refill    metFORMIN (GLUCOPHAGE-XR) 500 mg 24 hr tablet TAKE 1 TABLET BY MOUTH TWICE A DAY WITH MEALS 180 tablet 1    [DISCONTINUED] atorvastatin (LIPITOR) 20 mg tablet TAKE 1 TABLET BY MOUTH EVERY DAY WITH DINNER 90 tablet 1    multivitamin (THERAGRAN) TABS Take 1 tablet by mouth daily       No current facility-administered medications on file prior to visit.   No Known Allergies   Current Outpatient Medications on File Prior to Visit   Medication Sig Dispense Refill     "metFORMIN (GLUCOPHAGE-XR) 500 mg 24 hr tablet TAKE 1 TABLET BY MOUTH TWICE A DAY WITH MEALS 180 tablet 1    [DISCONTINUED] atorvastatin (LIPITOR) 20 mg tablet TAKE 1 TABLET BY MOUTH EVERY DAY WITH DINNER 90 tablet 1    multivitamin (THERAGRAN) TABS Take 1 tablet by mouth daily       No current facility-administered medications on file prior to visit.      Social History     Tobacco Use    Smoking status: Never    Smokeless tobacco: Not on file   Vaping Use    Vaping status: Never Used   Substance and Sexual Activity    Alcohol use: Yes     Alcohol/week: 1.0 standard drink of alcohol     Types: 1 Glasses of wine per week     Comment: social    Drug use: Never    Sexual activity: Not on file     Objective     /88 (BP Location: Left arm, Patient Position: Sitting, Cuff Size: Large)   Pulse 71   Temp (!) 97.2 °F (36.2 °C) (Tympanic)   Resp 16   Ht 5' 6\" (1.676 m)   Wt 103 kg (227 lb 12.8 oz)   SpO2 99%   BMI 36.77 kg/m²     Physical Exam  Constitutional:       General: She is not in acute distress.  Eyes:      Conjunctiva/sclera: Conjunctivae normal.   Cardiovascular:      Rate and Rhythm: Normal rate and regular rhythm.      Pulses: no weak pulses.           Dorsalis pedis pulses are 2+ on the right side and 2+ on the left side.      Heart sounds: Normal heart sounds.   Pulmonary:      Effort: Pulmonary effort is normal.      Breath sounds: Normal breath sounds.   Abdominal:      General: There is no distension.      Tenderness: There is no abdominal tenderness.   Musculoskeletal:      Right lower leg: No edema.      Left lower leg: No edema.   Feet:      Right foot:      Skin integrity: No ulcer, skin breakdown, erythema, warmth, callus or dry skin.      Left foot:      Skin integrity: No ulcer, skin breakdown, erythema, warmth, callus or dry skin.   Skin:     General: Skin is warm and dry.   Neurological:      Mental Status: She is alert.   Psychiatric:         Speech: Speech normal.         Behavior: " Behavior normal. Behavior is cooperative.       Administrative Statements         Patient's shoes and socks removed.    Right Foot/Ankle   Right Foot Inspection  Skin Exam: skin normal and skin intact. No dry skin, no warmth, no callus, no erythema, no maceration, no abnormal color, no pre-ulcer, no ulcer and no callus.     Toe Exam: ROM and strength within normal limits.     Sensory   Vibration: intact  Proprioception: intact  Monofilament testing: intact    Vascular  Capillary refills: < 3 seconds  The right DP pulse is 2+.     Left Foot/Ankle  Left Foot Inspection  Skin Exam: skin normal and skin intact. No dry skin, no warmth, no erythema, no maceration, normal color, no pre-ulcer, no ulcer and no callus.     Toe Exam: ROM and strength within normal limits.     Sensory   Vibration: intact  Proprioception: intact  Monofilament testing: intact    Vascular  Capillary refills: < 3 seconds  The left DP pulse is 2+.     Assign Risk Category  No deformity present  No loss of protective sensation  No weak pulses  Risk: 0

## 2024-09-09 ENCOUNTER — TELEPHONE (OUTPATIENT)
Age: 49
End: 2024-09-09

## 2024-09-09 NOTE — TELEPHONE ENCOUNTER
----- Message from Tong Jaime MD sent at 9/9/2024 11:57 AM EDT -----  Please let patient know that her labs show that she is anemic. I would like to check her iron levels to see if she is deficient, but I understand if she would like to hold off until our next visit as she is without insurance. I can place the order if   she wants to pursue this though. Her WBC was also slightly high. This can signify some degree of inflammation going on in the body or can happen in people with high BMI. No workup for this at this time, we can follow on repeat blood work.

## 2024-10-01 DIAGNOSIS — E11.9 TYPE 2 DIABETES MELLITUS WITHOUT COMPLICATION, WITHOUT LONG-TERM CURRENT USE OF INSULIN (HCC): ICD-10-CM

## 2024-10-01 RX ORDER — ROSUVASTATIN CALCIUM 5 MG/1
5 TABLET, COATED ORAL DAILY
Qty: 90 TABLET | Refills: 0 | Status: SHIPPED | OUTPATIENT
Start: 2024-10-01 | End: 2024-10-09 | Stop reason: SDUPTHER

## 2024-10-09 DIAGNOSIS — E11.9 TYPE 2 DIABETES MELLITUS WITHOUT COMPLICATION, WITHOUT LONG-TERM CURRENT USE OF INSULIN (HCC): ICD-10-CM

## 2024-10-09 RX ORDER — ROSUVASTATIN CALCIUM 5 MG/1
5 TABLET, COATED ORAL DAILY
Qty: 90 TABLET | Refills: 0 | Status: SHIPPED | OUTPATIENT
Start: 2024-10-09

## 2024-11-06 ENCOUNTER — OFFICE VISIT (OUTPATIENT)
Age: 49
End: 2024-11-06

## 2024-11-06 VITALS
WEIGHT: 225 LBS | HEIGHT: 66 IN | DIASTOLIC BLOOD PRESSURE: 80 MMHG | RESPIRATION RATE: 18 BRPM | OXYGEN SATURATION: 98 % | HEART RATE: 102 BPM | SYSTOLIC BLOOD PRESSURE: 124 MMHG | TEMPERATURE: 97.8 F | BODY MASS INDEX: 36.16 KG/M2

## 2024-11-06 DIAGNOSIS — R05.3 CHRONIC COUGH: ICD-10-CM

## 2024-11-06 DIAGNOSIS — D64.9 NORMOCYTIC ANEMIA: ICD-10-CM

## 2024-11-06 DIAGNOSIS — Z11.59 NEED FOR HEPATITIS C SCREENING TEST: ICD-10-CM

## 2024-11-06 DIAGNOSIS — I10 PRIMARY HYPERTENSION: ICD-10-CM

## 2024-11-06 DIAGNOSIS — E11.9 TYPE 2 DIABETES MELLITUS WITHOUT COMPLICATION, WITHOUT LONG-TERM CURRENT USE OF INSULIN (HCC): Primary | ICD-10-CM

## 2024-11-06 DIAGNOSIS — Z11.4 SCREENING FOR HIV (HUMAN IMMUNODEFICIENCY VIRUS): ICD-10-CM

## 2024-11-06 DIAGNOSIS — Z59.71 DOES NOT HAVE HEALTH INSURANCE: ICD-10-CM

## 2024-11-06 LAB — SL AMB POCT HEMOGLOBIN AIC: 7.4 (ref ?–6.5)

## 2024-11-06 PROCEDURE — 99214 OFFICE O/P EST MOD 30 MIN: CPT | Performed by: STUDENT IN AN ORGANIZED HEALTH CARE EDUCATION/TRAINING PROGRAM

## 2024-11-06 PROCEDURE — 83036 HEMOGLOBIN GLYCOSYLATED A1C: CPT | Performed by: STUDENT IN AN ORGANIZED HEALTH CARE EDUCATION/TRAINING PROGRAM

## 2024-11-06 RX ORDER — ALBUTEROL SULFATE 90 UG/1
2 INHALANT RESPIRATORY (INHALATION) EVERY 6 HOURS PRN
Qty: 6.7 G | Refills: 5 | Status: SHIPPED | OUTPATIENT
Start: 2024-11-06

## 2024-11-06 NOTE — ASSESSMENT & PLAN NOTE
Lab Results   Component Value Date    HGBA1C 7.4 (A) 11/06/2024     POCT A1c 7.4, improved from 9.0. Tolerating metformin well. Recommended increase in metformin to 1000 mg BID rather than add additional agent. Also discussed with patient about dietary modification, and she reports that her diet is still high in carbs.  Patient would like to work on dietary modification rather than increase metformin at this time as she is near goal  Follow up in 3 mos with repeat A1c  Continue metformin 500 mg BID  On ACEi and statin  Patient defers diabetic eye exam until insurance coverage starts in the new year    Orders:    Albumin / creatinine urine ratio; Future    POCT hemoglobin A1c

## 2024-11-06 NOTE — PROGRESS NOTES
Ambulatory Visit  Name: Rizwana Little      : 1975      MRN: [id number]  Encounter Provider: Tong Jaime MD  Encounter Date: 2024   Encounter department: [facility] PRIMARY CARE [place]    Assessment & Plan  Type 2 diabetes mellitus without complication, without long-term current use of insulin (HCC)    Lab Results   Component Value Date    HGBA1C 7.4 (A) 2024     POCT A1c 7.4, improved from 9.0. Tolerating metformin well. Recommended increase in metformin to 1000 mg BID rather than add additional agent. Also discussed with patient about dietary modification, and she reports that her diet is still high in carbs.  Patient would like to work on dietary modification rather than increase metformin at this time as she is near goal  Follow up in 3 mos with repeat A1c  Continue metformin 500 mg BID  On ACEi and statin  Patient defers diabetic eye exam until insurance coverage starts in the new year    Orders:    Albumin / creatinine urine ratio; Future    POCT hemoglobin A1c    Primary hypertension  BP well controlled. Continue lisinopril 10 mg qd.       Normocytic anemia  Seen on recent CBC. Check iron panel - patient will plan to get when she gets insurance coverage in January.    Orders:    Iron Panel (Includes Ferritin, Iron Sat%, Iron, and TIBC); Future    Chronic cough  >1 year. Patient reports it is worse in the cold weather. She was given albuterol and steroids last year for similar symptoms. She was offered further workup but was deferred 2/2 lack of insurance coverage.  Trial daily Zyrtec/Allegra for possible UACS  Albuterol inhaler PRN  Consider PFTs when insurance coverage starts  If no improvement with antihistamine, can consider PPI trial as patient states she has history of heartburn but not so much anymore  She is on ACEi but her cough predates when she started the ACEi    Orders:    albuterol (Proventil HFA) 90 mcg/act inhaler; Inhale 2 puffs every 6 (six) hours as  needed for wheezing    Need for hepatitis C screening test    Orders:    Hepatitis C Antibody; Future    Screening for HIV (human immunodeficiency virus)    Orders:    HIV 1/2 AG/AB w Reflex [facility] for 2 yr old and above; Future    Does not have health insurance  Will defer diabetic eye exam, cervical cancer screening, colorectal cancer screening, mammogram and labs until she obtains insurance coverage in the new year.          History of Present Illness     Rizwana Little is a 50 yo F with PMH of HTN and DM2 who presents today for follow up. She has been tolerating metformin, crestor, and lisinopril well. She reports chronic cough for over a year. Cough is dry and worse with cold weather. She was treated with albuterol and steroids last year. She reports further workup was deferred due to lack of insurance coverage. She feels like she is again having similar symptoms. She is on lisinopril but she states the cough predates when she started the medication.          Review of Systems   Constitutional:  Negative for fatigue.   HENT:  Negative for congestion and sore throat.    Respiratory:  Positive for cough. Negative for shortness of breath and wheezing.    Cardiovascular:  Negative for chest pain.   Gastrointestinal:  Positive for constipation. Negative for abdominal distention, diarrhea and nausea.     Medical History Reviewed by provider this encounter:  Meds       Past Medical History   Past Medical History:   Diagnosis Date    Diabetes mellitus (HCC)     Elevated BP without diagnosis of hypertension 2024    gallbladder     Impaired fasting glucose 2022     Past Surgical History:   Procedure Laterality Date     SECTION      CHOLECYSTECTOMY      GALLBLADDER SURGERY       Family History   Problem Relation Age of Onset    Diabetes Sister     Diabetes Mother      Current Outpatient Medications on File Prior to Visit   Medication Sig Dispense Refill    lisinopril (ZESTRIL) 10 mg tablet Take 1  "tablet (10 mg total) by mouth daily 100 tablet 3    metFORMIN (GLUCOPHAGE-XR) 500 mg 24 hr tablet TAKE 1 TABLET BY MOUTH TWICE A DAY WITH MEALS 180 tablet 1    multivitamin (THERAGRAN) TABS Take 1 tablet by mouth daily      rosuvastatin (CRESTOR) 5 mg tablet Take 1 tablet (5 mg total) by mouth daily 90 tablet 0     No current facility-administered medications on file prior to visit.   No Known Allergies   Current Outpatient Medications on File Prior to Visit   Medication Sig Dispense Refill    lisinopril (ZESTRIL) 10 mg tablet Take 1 tablet (10 mg total) by mouth daily 100 tablet 3    metFORMIN (GLUCOPHAGE-XR) 500 mg 24 hr tablet TAKE 1 TABLET BY MOUTH TWICE A DAY WITH MEALS 180 tablet 1    multivitamin (THERAGRAN) TABS Take 1 tablet by mouth daily      rosuvastatin (CRESTOR) 5 mg tablet Take 1 tablet (5 mg total) by mouth daily 90 tablet 0     No current facility-administered medications on file prior to visit.      Social History     Tobacco Use    Smoking status: Never    Smokeless tobacco: Never   Vaping Use    Vaping status: Never Used   Substance and Sexual Activity    Alcohol use: Yes     Alcohol/week: 1.0 standard drink of alcohol     Types: 1 Glasses of wine per week     Comment: social    Drug use: Never    Sexual activity: Not on file         Objective     /80 (BP Location: Left arm, Patient Position: Sitting, Cuff Size: Standard)   Pulse 102   Temp 97.8 °F (36.6 °C) (Tympanic)   Resp 18   Ht 5' 6\" (1.676 m)   Wt 102 kg (225 lb)   SpO2 98%   BMI 36.32 kg/m²     Physical Exam  Constitutional:       General: She is not in acute distress.  Eyes:      Conjunctiva/sclera: Conjunctivae normal.   Cardiovascular:      Rate and Rhythm: Normal rate and regular rhythm.      Heart sounds: Normal heart sounds.   Pulmonary:      Effort: Pulmonary effort is normal.      Breath sounds: Normal breath sounds.   Abdominal:      General: There is no distension.      Tenderness: There is no abdominal tenderness. "   Musculoskeletal:      Right lower leg: No edema.      Left lower leg: No edema.   Skin:     General: Skin is warm and dry.   Neurological:      Mental Status: She is alert.   Psychiatric:         Speech: Speech normal.         Behavior: Behavior normal. Behavior is cooperative.

## 2024-12-12 ENCOUNTER — TELEPHONE (OUTPATIENT)
Age: 49
End: 2024-12-12

## 2025-01-09 ENCOUNTER — OFFICE VISIT (OUTPATIENT)
Age: 50
End: 2025-01-09
Payer: COMMERCIAL

## 2025-01-09 ENCOUNTER — APPOINTMENT (OUTPATIENT)
Age: 50
End: 2025-01-09
Payer: COMMERCIAL

## 2025-01-09 VITALS
DIASTOLIC BLOOD PRESSURE: 80 MMHG | TEMPERATURE: 98 F | SYSTOLIC BLOOD PRESSURE: 130 MMHG | BODY MASS INDEX: 37.19 KG/M2 | RESPIRATION RATE: 18 BRPM | OXYGEN SATURATION: 98 % | HEIGHT: 66 IN | HEART RATE: 76 BPM | WEIGHT: 231.4 LBS

## 2025-01-09 DIAGNOSIS — E11.9 TYPE 2 DIABETES MELLITUS WITHOUT COMPLICATION, WITHOUT LONG-TERM CURRENT USE OF INSULIN (HCC): ICD-10-CM

## 2025-01-09 DIAGNOSIS — G47.19 EXCESSIVE DAYTIME SLEEPINESS: ICD-10-CM

## 2025-01-09 DIAGNOSIS — D64.9 NORMOCYTIC ANEMIA: ICD-10-CM

## 2025-01-09 DIAGNOSIS — Z11.4 SCREENING FOR HIV (HUMAN IMMUNODEFICIENCY VIRUS): ICD-10-CM

## 2025-01-09 DIAGNOSIS — L60.8 TOENAIL DEFORMITY: ICD-10-CM

## 2025-01-09 DIAGNOSIS — Z12.4 SCREENING FOR CERVICAL CANCER: ICD-10-CM

## 2025-01-09 DIAGNOSIS — I10 PRIMARY HYPERTENSION: ICD-10-CM

## 2025-01-09 DIAGNOSIS — Z11.59 NEED FOR HEPATITIS C SCREENING TEST: ICD-10-CM

## 2025-01-09 DIAGNOSIS — Z00.00 ANNUAL PHYSICAL EXAM: Primary | ICD-10-CM

## 2025-01-09 PROCEDURE — 83540 ASSAY OF IRON: CPT

## 2025-01-09 PROCEDURE — 82570 ASSAY OF URINE CREATININE: CPT

## 2025-01-09 PROCEDURE — 99396 PREV VISIT EST AGE 40-64: CPT | Performed by: STUDENT IN AN ORGANIZED HEALTH CARE EDUCATION/TRAINING PROGRAM

## 2025-01-09 PROCEDURE — 92250 FUNDUS PHOTOGRAPHY W/I&R: CPT | Performed by: STUDENT IN AN ORGANIZED HEALTH CARE EDUCATION/TRAINING PROGRAM

## 2025-01-09 PROCEDURE — 87389 HIV-1 AG W/HIV-1&-2 AB AG IA: CPT

## 2025-01-09 PROCEDURE — 36415 COLL VENOUS BLD VENIPUNCTURE: CPT

## 2025-01-09 PROCEDURE — 83550 IRON BINDING TEST: CPT

## 2025-01-09 PROCEDURE — 86803 HEPATITIS C AB TEST: CPT

## 2025-01-09 PROCEDURE — 82043 UR ALBUMIN QUANTITATIVE: CPT

## 2025-01-09 PROCEDURE — 82728 ASSAY OF FERRITIN: CPT

## 2025-01-09 NOTE — PROGRESS NOTES
Adult Annual Physical  Name: Rizwana Little      : 1975      MRN: [id number]  Encounter Provider: Tong Jaime MD  Encounter Date: 2025   Encounter department: [facility] CARE [place]    Assessment & Plan  Annual physical exam  Immunizations: Patient declines any vaccinations  Labs: Labs pending from previous include HIV, Hep C, iron panel, and alb/cr ratio; A1c ordered to be done in 1 month  Screening: mammogram previously ordered; GI referral for colonoscopy previously ordered; OBGYN referral placed for cervical cancer screening; HIV and Hep C previously ordered.        Type 2 diabetes mellitus without complication, without long-term current use of insulin ([facility])    Lab Results   Component Value Date    HGBA1C 7.4 (A) 2024     Most recent A1c improving from previous. Current regimen is metformin 500 mg BID. She was advised to increase to 1000 mg BID at last office visit but she wished to maintain dose and work on lifestyle modification.  Continue metformin 500 mg BID  Recheck A1c in 1 month  Diabetic eye exam done today  Continue lisinopril and crestor current dosing      Orders:    IRIS Diabetic eye exam    Hemoglobin A1C; Future    Primary hypertension  BP well controlled. Continue lisinopril 10 mg qd.       Normocytic anemia  Iron panel previously ordered - patient to get done.       Excessive daytime sleepiness  Loud snoring, witnessed gasping, unrefreshing sleep, and excessive daytime sleepiness. Will send for sleep study.    Orders:    Ambulatory Referral to Sleep Medicine; Future    Toenail deformity  Over a year of thick, dark discolored left big toenail. Refer to Podiatry.    Orders:    Ambulatory Referral to Podiatry; Future    Screening for cervical cancer    Orders:    Ambulatory referral to Obstetrics / Gynecology; Future      Immunizations and preventive care screenings were discussed with patient today. Appropriate education was printed on patient's after  visit summary.    Counseling:  Dental Health: discussed importance of regular tooth brushing, flossing, and dental visits.  Exercise: the importance of regular exercise/physical activity was discussed. Recommend exercise 3-5 times per week for at least 30 minutes.          History of Present Illness     Adult Annual Physical:  Patient presents for annual physical. Rizwana Little is a 48 yo F with PMH of HTN, T2DM who presents today for annual physical. She has her new health insurance set up. She reports loud snoring, witnessed gasping, unrefreshing sleep, and excessive daytime sleepiness. She also reports 1 year of thick, discolored toenail on her left foot that she wishes to see a podiatrist for..     Diet and Physical Activity:  - Diet/Nutrition:. High carb diet - trying to cut down  - Exercise: no formal exercise.    General Health:  - Sleep: sleeps poorly, unrefreshing sleep, daytime hypersomnolence, snores loudly and witnessed gasping.  - Hearing: normal hearing bilateral ears.  - Vision: wears glasses and most recent eye exam > 1 year ago.  - Dental: no dental visits for > 1 year and brushes teeth once daily.    /GYN Health:  - Follows with GYN: no.   - Last menstrual cycle: 12/26/2024.   - History of STDs: no    Advanced Care Planning:  - Has an advanced directive?: no    - Has a durable medical POA?: no    - ACP document given to patient?: yes      Review of Systems   Constitutional:  Negative for appetite change, chills and fever.   HENT:  Negative for congestion and sore throat.    Eyes:  Negative for visual disturbance.   Respiratory:  Negative for cough and shortness of breath.    Cardiovascular:  Negative for chest pain and leg swelling.   Gastrointestinal:  Negative for abdominal pain, constipation, diarrhea and nausea.   Genitourinary:  Negative for difficulty urinating and dysuria.   Musculoskeletal:  Negative for arthralgias and myalgias.   Skin:  Negative for rash.   Neurological:  Negative  for dizziness, light-headedness and headaches.   Psychiatric/Behavioral:  Positive for sleep disturbance. Negative for confusion.      Medical History Reviewed by provider this encounter:  Meds     .  Past Medical History   Past Medical History:   Diagnosis Date    Diabetes mellitus (HCC)     Elevated BP without diagnosis of hypertension 2024    gallbladder     Impaired fasting glucose 2022     Past Surgical History:   Procedure Laterality Date     SECTION      CHOLECYSTECTOMY      GALLBLADDER SURGERY       Family History   Problem Relation Age of Onset    Diabetes Sister     Diabetes Mother       reports that she has never smoked. She has never used smokeless tobacco. She reports current alcohol use of about 1.0 standard drink of alcohol per week. She reports that she does not use drugs.  Current Outpatient Medications on File Prior to Visit   Medication Sig Dispense Refill    albuterol (Proventil HFA) 90 mcg/act inhaler Inhale 2 puffs every 6 (six) hours as needed for wheezing 6.7 g 5    lisinopril (ZESTRIL) 10 mg tablet Take 1 tablet (10 mg total) by mouth daily 100 tablet 3    metFORMIN (GLUCOPHAGE-XR) 500 mg 24 hr tablet TAKE 1 TABLET BY MOUTH TWICE A DAY WITH MEALS 180 tablet 1    multivitamin (THERAGRAN) TABS Take 1 tablet by mouth daily      rosuvastatin (CRESTOR) 5 mg tablet Take 1 tablet (5 mg total) by mouth daily 90 tablet 0     No current facility-administered medications on file prior to visit.   No Known Allergies   Current Outpatient Medications on File Prior to Visit   Medication Sig Dispense Refill    albuterol (Proventil HFA) 90 mcg/act inhaler Inhale 2 puffs every 6 (six) hours as needed for wheezing 6.7 g 5    lisinopril (ZESTRIL) 10 mg tablet Take 1 tablet (10 mg total) by mouth daily 100 tablet 3    metFORMIN (GLUCOPHAGE-XR) 500 mg 24 hr tablet TAKE 1 TABLET BY MOUTH TWICE A DAY WITH MEALS 180 tablet 1    multivitamin (THERAGRAN) TABS Take 1 tablet by mouth daily       "rosuvastatin (CRESTOR) 5 mg tablet Take 1 tablet (5 mg total) by mouth daily 90 tablet 0     No current facility-administered medications on file prior to visit.      Social History     Tobacco Use    Smoking status: Never    Smokeless tobacco: Never   Vaping Use    Vaping status: Never Used   Substance and Sexual Activity    Alcohol use: Yes     Alcohol/week: 1.0 standard drink of alcohol     Types: 1 Glasses of wine per week     Comment: social    Drug use: Never    Sexual activity: Not on file       Objective   /80 (BP Location: Right arm, Patient Position: Sitting, Cuff Size: Large)   Pulse 76   Temp 98 °F (36.7 °C) (Tympanic)   Resp 18   Ht 5' 6\" (1.676 m)   Wt 105 kg (231 lb 6.4 oz)   SpO2 98%   BMI 37.35 kg/m²     Physical Exam  Constitutional:       General: She is not in acute distress.  HENT:      Right Ear: Tympanic membrane, ear canal and external ear normal.      Left Ear: Tympanic membrane, ear canal and external ear normal.      Nose: Nose normal.      Mouth/Throat:      Mouth: Mucous membranes are moist.      Pharynx: Oropharynx is clear.   Eyes:      Conjunctiva/sclera: Conjunctivae normal.      Pupils: Pupils are equal, round, and reactive to light.   Neck:      Thyroid: No thyroid mass or thyroid tenderness.   Cardiovascular:      Rate and Rhythm: Normal rate and regular rhythm.      Heart sounds: Normal heart sounds.   Pulmonary:      Effort: Pulmonary effort is normal.      Breath sounds: Normal breath sounds.   Abdominal:      General: There is no distension.      Tenderness: There is no abdominal tenderness.   Musculoskeletal:      Cervical back: Neck supple.      Right lower leg: No edema.      Left lower leg: No edema.   Feet:      Comments: Thick, dark discolored left great toenail  Skin:     General: Skin is warm and dry.   Neurological:      Mental Status: She is alert.      Sensory: Sensation is intact.      Motor: Motor function is intact.   Psychiatric:         Mood and " Affect: Mood normal.         Speech: Speech normal.         Behavior: Behavior normal. Behavior is cooperative.

## 2025-01-09 NOTE — ASSESSMENT & PLAN NOTE
Lab Results   Component Value Date    HGBA1C 7.4 (A) 11/06/2024     Most recent A1c improving from previous. Current regimen is metformin 500 mg BID. She was advised to increase to 1000 mg BID at last office visit but she wished to maintain dose and work on lifestyle modification.  Continue metformin 500 mg BID  Recheck A1c in 1 month  Diabetic eye exam done today  Continue lisinopril and crestor current dosing      Orders:    IRIS Diabetic eye exam    Hemoglobin A1C; Future

## 2025-01-09 NOTE — PATIENT INSTRUCTIONS
"Patient Education     Routine physical for adults   The Basics   Written by the doctors and editors at [facility]   What is a physical? -- A physical is a routine visit, or \"check-up,\" with your doctor. You might also hear it called a \"wellness visit\" or \"preventive visit.\"  During each visit, the doctor will:   Ask about your physical and mental health   Ask about your habits, behaviors, and lifestyle   Do an exam   Give you vaccines if needed   Talk to you about any medicines you take   Give advice about your health   Answer your questions  Getting regular check-ups is an important part of taking care of your health. It can help your doctor find and treat any problems you have. But it's also important for preventing health problems.  A routine physical is different from a \"sick visit.\" A sick visit is when you see a doctor because of a health concern or problem. Since physicals are scheduled ahead of time, you can think about what you want to ask the doctor.  How often should I get a physical? -- It depends on your age and health. In general, for people age 21 years and older:   If you are younger than 50 years, you might be able to get a physical every 3 years.   If you are 50 years or older, your doctor might recommend a physical every year.  If you have an ongoing health condition, like diabetes or high blood pressure, your doctor will probably want to see you more often.  What happens during a physical? -- In general, each visit will include:   Physical exam - The doctor or nurse will check your height, weight, heart rate, and blood pressure. They will also look at your eyes and ears. They will ask about how you are feeling and whether you have any symptoms that bother you.   Medicines - It's a good idea to bring a list of all the medicines you take to each doctor visit. Your doctor will talk to you about your medicines and answer any questions. Tell them if you are having any side effects that bother you. You " "should also tell them if you are having trouble paying for any of your medicines.   Habits and behaviors - This includes:   Your diet   Your exercise habits   Whether you smoke, drink alcohol, or use drugs   Whether you are sexually active   Whether you feel safe at home  Your doctor will talk to you about things you can do to improve your health and lower your risk of health problems. They will also offer help and support. For example, if you want to quit smoking, they can give you advice and might prescribe medicines. If you want to improve your diet or get more physical activity, they can help you with this, too.   Lab tests, if needed - The tests you get will depend on your age and situation. For example, your doctor might want to check your:   Cholesterol   Blood sugar   Iron level   Vaccines - The recommended vaccines will depend on your age, health, and what vaccines you already had. Vaccines are very important because they can prevent certain serious or deadly infections.   Discussion of screening - \"Screening\" means checking for diseases or other health problems before they cause symptoms. Your doctor can recommend screening based on your age, risk, and preferences. This might include tests to check for:   Cancer, such as breast, prostate, cervical, ovarian, colorectal, prostate, lung, or skin cancer   Sexually transmitted infections, such as chlamydia and gonorrhea   Mental health conditions like depression and anxiety  Your doctor will talk to you about the different types of screening tests. They can help you decide which screenings to have. They can also explain what the results might mean.   Answering questions - The physical is a good time to ask the doctor or nurse questions about your health. If needed, they can refer you to other doctors or specialists, too.  Adults older than 65 years often need other care, too. As you get older, your doctor will talk to you about:   How to prevent falling at " home   Hearing or vision tests   Memory testing   How to take your medicines safely   Making sure that you have the help and support you need at home  All topics are updated as new evidence becomes available and our peer review process is complete.  This topic retrieved from [employer] on: May 02, 2024.  Topic 317165 Version 1.0  Release: 32.4.3 - C32.122  © 2024 UpToDate, Inc. and/or its affiliates. All rights reserved.  Consumer Information Use and Disclaimer   Disclaimer: This generalized information is a limited summary of diagnosis, treatment, and/or medication information. It is not meant to be comprehensive and should be used as a tool to help the user understand and/or assess potential diagnostic and treatment options. It does NOT include all information about conditions, treatments, medications, side effects, or risks that may apply to a specific patient. It is not intended to be medical advice or a substitute for the medical advice, diagnosis, or treatment of a health care provider based on the health care provider's examination and assessment of a patient's specific and unique circumstances. Patients must speak with a health care provider for complete information about their health, medical questions, and treatment options, including any risks or benefits regarding use of medications. This information does not endorse any treatments or medications as safe, effective, or approved for treating a specific patient. UpToDate, Inc. and its affiliates disclaim any warranty or liability relating to this information or the use thereof.The use of this information is governed by the Terms of Use, available at https://www.woltersCompiereuwer.com/en/know/clinical-effectiveness-terms. 2024© UpToDate, Inc. and its affiliates and/or licensors. All rights reserved.  Copyright   © 2024 UpToDate, Inc. and/or its affiliates. All rights reserved.

## 2025-01-10 ENCOUNTER — TRANSCRIBE ORDERS (OUTPATIENT)
Dept: SLEEP CENTER | Facility: CLINIC | Age: 50
End: 2025-01-10

## 2025-01-10 ENCOUNTER — RESULTS FOLLOW-UP (OUTPATIENT)
Age: 50
End: 2025-01-10

## 2025-01-10 DIAGNOSIS — G47.19 EXCESSIVE DAYTIME SLEEPINESS: Primary | ICD-10-CM

## 2025-01-10 DIAGNOSIS — D50.9 IRON DEFICIENCY ANEMIA, UNSPECIFIED IRON DEFICIENCY ANEMIA TYPE: Primary | ICD-10-CM

## 2025-01-10 DIAGNOSIS — Z12.11 SCREENING FOR COLON CANCER: Primary | ICD-10-CM

## 2025-01-10 LAB
CREAT UR-MCNC: 82.2 MG/DL
FERRITIN SERPL-MCNC: 9 NG/ML (ref 11–307)
HCV AB SER QL: NORMAL
HIV 1+2 AB+HIV1 P24 AG SERPL QL IA: NORMAL
HIV 2 AB SERPL QL IA: NORMAL
HIV1 AB SERPL QL IA: NORMAL
HIV1 P24 AG SERPL QL IA: NORMAL
IRON SATN MFR SERPL: 5 % (ref 15–50)
IRON SERPL-MCNC: 29 UG/DL (ref 50–212)
LEFT EYE DIABETIC RETINOPATHY: NORMAL
LEFT EYE IMAGE QUALITY: NORMAL
LEFT EYE MACULAR EDEMA: NORMAL
LEFT EYE OTHER RETINOPATHY: NORMAL
MICROALBUMIN UR-MCNC: 7.6 MG/L
MICROALBUMIN/CREAT 24H UR: 9 MG/G CREATININE (ref 0–30)
RIGHT EYE DIABETIC RETINOPATHY: NORMAL
RIGHT EYE IMAGE QUALITY: NORMAL
RIGHT EYE MACULAR EDEMA: NORMAL
RIGHT EYE OTHER RETINOPATHY: NORMAL
SEVERITY (EYE EXAM): NORMAL
TIBC SERPL-MCNC: 529.2 UG/DL (ref 250–450)
TRANSFERRIN SERPL-MCNC: 378 MG/DL (ref 203–362)
UIBC SERPL-MCNC: 500 UG/DL (ref 155–355)

## 2025-01-10 RX ORDER — FERROUS SULFATE 324(65)MG
324 TABLET, DELAYED RELEASE (ENTERIC COATED) ORAL
Qty: 15 TABLET | Refills: 1 | Status: SHIPPED | OUTPATIENT
Start: 2025-01-10 | End: 2025-04-10

## 2025-02-04 DIAGNOSIS — D50.9 IRON DEFICIENCY ANEMIA, UNSPECIFIED IRON DEFICIENCY ANEMIA TYPE: ICD-10-CM

## 2025-02-05 ENCOUNTER — APPOINTMENT (OUTPATIENT)
Age: 50
End: 2025-02-05
Payer: COMMERCIAL

## 2025-02-05 ENCOUNTER — CONSULT (OUTPATIENT)
Age: 50
End: 2025-02-05
Payer: COMMERCIAL

## 2025-02-05 VITALS
HEIGHT: 66 IN | BODY MASS INDEX: 36.8 KG/M2 | DIASTOLIC BLOOD PRESSURE: 90 MMHG | WEIGHT: 229 LBS | SYSTOLIC BLOOD PRESSURE: 138 MMHG

## 2025-02-05 DIAGNOSIS — Z11.3 ROUTINE SCREENING FOR STI (SEXUALLY TRANSMITTED INFECTION): ICD-10-CM

## 2025-02-05 DIAGNOSIS — Z12.31 ENCOUNTER FOR SCREENING MAMMOGRAM FOR MALIGNANT NEOPLASM OF BREAST: ICD-10-CM

## 2025-02-05 DIAGNOSIS — Z11.51 SCREENING FOR HPV (HUMAN PAPILLOMAVIRUS): ICD-10-CM

## 2025-02-05 DIAGNOSIS — E11.9 TYPE 2 DIABETES MELLITUS WITHOUT COMPLICATION, WITHOUT LONG-TERM CURRENT USE OF INSULIN (HCC): ICD-10-CM

## 2025-02-05 DIAGNOSIS — Z01.419 ENCOUNTER FOR GYNECOLOGICAL EXAMINATION (GENERAL) (ROUTINE) WITHOUT ABNORMAL FINDINGS: Primary | ICD-10-CM

## 2025-02-05 DIAGNOSIS — Z12.11 ENCOUNTER FOR SCREENING FOR MALIGNANT NEOPLASM OF COLON: ICD-10-CM

## 2025-02-05 DIAGNOSIS — Z87.42 HISTORY OF OVARIAN CYST: ICD-10-CM

## 2025-02-05 DIAGNOSIS — Z12.4 SCREENING FOR CERVICAL CANCER: ICD-10-CM

## 2025-02-05 LAB
EST. AVERAGE GLUCOSE BLD GHB EST-MCNC: 171 MG/DL
HBA1C MFR BLD: 7.6 %
TREPONEMA PALLIDUM IGG+IGM AB [PRESENCE] IN SERUM OR PLASMA BY IMMUNOASSAY: NORMAL

## 2025-02-05 PROCEDURE — 99386 PREV VISIT NEW AGE 40-64: CPT | Performed by: OBSTETRICS & GYNECOLOGY

## 2025-02-05 PROCEDURE — 87661 TRICHOMONAS VAGINALIS AMPLIF: CPT | Performed by: OBSTETRICS & GYNECOLOGY

## 2025-02-05 PROCEDURE — 83036 HEMOGLOBIN GLYCOSYLATED A1C: CPT

## 2025-02-05 PROCEDURE — 86780 TREPONEMA PALLIDUM: CPT

## 2025-02-05 PROCEDURE — G0145 SCR C/V CYTO,THINLAYER,RESCR: HCPCS | Performed by: OBSTETRICS & GYNECOLOGY

## 2025-02-05 PROCEDURE — 87591 N.GONORRHOEAE DNA AMP PROB: CPT | Performed by: OBSTETRICS & GYNECOLOGY

## 2025-02-05 PROCEDURE — 87491 CHLMYD TRACH DNA AMP PROBE: CPT | Performed by: OBSTETRICS & GYNECOLOGY

## 2025-02-05 PROCEDURE — G0476 HPV COMBO ASSAY CA SCREEN: HCPCS | Performed by: OBSTETRICS & GYNECOLOGY

## 2025-02-05 PROCEDURE — 36415 COLL VENOUS BLD VENIPUNCTURE: CPT

## 2025-02-05 RX ORDER — FERROUS SULFATE 324(65)MG
324 TABLET, DELAYED RELEASE (ENTERIC COATED) ORAL
Qty: 45 TABLET | Refills: 1 | Status: SHIPPED | OUTPATIENT
Start: 2025-02-05 | End: 2025-05-06

## 2025-02-05 NOTE — PROGRESS NOTES
Assessment and Plan:  Yearly exam without abnormality.     - Pap collected today. We reviewed ASCCP guidelines for Pap testing today.   - Mammo slip provided   - GI referral placed  - desires STI testing today  - check pelvic u/s for hx of ovarian cyst  - diet and exercise reviewed    RTO one year for yearly exam or sooner as needed.        CC:  Yearly exam    S:  49 y.o. female here for yearly exam.   New patient- last GYN visit >10 years ago. In [place].   Lost insurance for a while- now re-establishing care.   Hx ovarian cysts years ago. No follow.     , 2 c-sections  Patient's last menstrual period was 2025 (approximate).  Contraception: n/a  Last Pap: unknown, denies hx of abnormal   Last Mammo: unknown, denies hx of abnormal   Last Colonoscopy: reports years ago, not sure when.     Non-smoker, rare alcohol, denies drug use  Exercises irregularly, balanced diet     Doing ok overall.     Her cycles are regular, not heavy or crampy.     Sexual activity: She is not sexually active without pain, bleeding or dryness.     STD testing:  She does want STD testing today.     Gardasil:  She has not had the Gardasil series.     Family hx of breast cancer: No  Family hx of ovarian cancer: No  Family hx of colon cancer: No    Denies hot flushes, dyspareunia, abnormal uterine bleeding, urinary/fecal incontinence, changes in energy levels, mood.       Current Outpatient Medications:     albuterol (Proventil HFA) 90 mcg/act inhaler, Inhale 2 puffs every 6 (six) hours as needed for wheezing, Disp: 6.7 g, Rfl: 5    ferrous sulfate 324 (65 Fe) mg, Take 1 tablet (324 mg total) by mouth every other day with breakfast, Disp: 15 tablet, Rfl: 1    lisinopril (ZESTRIL) 10 mg tablet, Take 1 tablet (10 mg total) by mouth daily, Disp: 100 tablet, Rfl: 3    metFORMIN (GLUCOPHAGE-XR) 500 mg 24 hr tablet, TAKE 1 TABLET BY MOUTH TWICE A DAY WITH MEALS, Disp: 180 tablet, Rfl: 1    multivitamin (THERAGRAN) TABS, Take 1 tablet by mouth  daily, Disp: , Rfl:     rosuvastatin (CRESTOR) 5 mg tablet, Take 1 tablet (5 mg total) by mouth daily, Disp: 90 tablet, Rfl: 0  Social History     Socioeconomic History    Marital status: /Civil Union     Spouse name: Not on file    Number of children: Not on file    Years of education: Not on file    Highest education level: Not on file   Occupational History    Not on file   Tobacco Use    Smoking status: Never    Smokeless tobacco: Never   Vaping Use    Vaping status: Never Used   Substance and Sexual Activity    Alcohol use: Yes     Alcohol/week: 1.0 standard drink of alcohol     Types: 1 Glasses of wine per week     Comment: social    Drug use: Never    Sexual activity: Not Currently     Partners: Male   Other Topics Concern    Not on file   Social History Narrative    Not on file     Social Drivers of Health     Financial Resource Strain: Not on file   Food Insecurity: Not on file   Transportation Needs: Not on file   Physical Activity: Sufficiently Active (9/6/2024)    Exercise Vital Sign     Days of Exercise per Week: 4 days     Minutes of Exercise per Session: 60 min   Stress: Not on file   Social Connections: Unknown (6/18/2024)    Received from [facility]    Social TrendMD     How often do you feel lonely or isolated from those around you? (Adult - for ages 18 years and over): Not on file   Intimate Partner Violence: Not on file   Housing Stability: Not on file     Family History   Problem Relation Age of Onset    Diabetes Sister     Diabetes Mother       Past Medical History:   Diagnosis Date    Diabetes mellitus (HCC)     Elevated BP without diagnosis of hypertension 08/05/2024    gallbladder     Impaired fasting glucose 07/19/2022        Review of Systems   Respiratory: Negative.    Cardiovascular: Negative.    Gastrointestinal: Negative for constipation and diarrhea.   Genitourinary: Negative for difficulty urinating, pelvic pain, vaginal bleeding, vaginal discharge, itching or  "odor.    O:  Blood pressure 138/90, height 5' 6\" (1.676 m), weight 104 kg (229 lb), last menstrual period 01/25/2025.    Exam was chaperoned.   Patient appears well and is not in distress  Neck is supple without masses  Breasts are symmetrical without mass, tenderness, nipple discharge, skin changes or adenopathy.   Abdomen is soft and nontender without masses.   External genitals are normal without lesions or rashes.  Urethral meatus and urethra are normal  Bladder is normal to palpation  Vagina is normal without discharge or bleeding.   Cervix is normal without discharge or lesion.   Uterus is normal, mobile, nontender without palpable mass.  Adnexa are normal, nontender, without palpable mass.    "

## 2025-02-06 ENCOUNTER — RESULTS FOLLOW-UP (OUTPATIENT)
Age: 50
End: 2025-02-06

## 2025-02-06 LAB
HPV HR 12 DNA CVX QL NAA+PROBE: NEGATIVE
HPV16 DNA CVX QL NAA+PROBE: NEGATIVE
HPV18 DNA CVX QL NAA+PROBE: NEGATIVE

## 2025-02-07 LAB
C TRACH DNA SPEC QL NAA+PROBE: NEGATIVE
N GONORRHOEA DNA SPEC QL NAA+PROBE: NEGATIVE
T VAGINALIS DNA SPEC QL NAA+PROBE: NEGATIVE

## 2025-02-10 ENCOUNTER — HOSPITAL ENCOUNTER (OUTPATIENT)
Dept: ULTRASOUND IMAGING | Facility: CLINIC | Age: 50
Discharge: HOME | End: 2025-02-10
Payer: COMMERCIAL

## 2025-02-10 DIAGNOSIS — Z87.42 HISTORY OF OVARIAN CYST: ICD-10-CM

## 2025-02-10 LAB
LAB AP GYN PRIMARY INTERPRETATION: NORMAL
Lab: NORMAL

## 2025-02-10 PROCEDURE — 76830 TRANSVAGINAL US NON-OB: CPT

## 2025-02-10 PROCEDURE — 76856 US EXAM PELVIC COMPLETE: CPT

## 2025-02-19 ENCOUNTER — OFFICE VISIT (OUTPATIENT)
Dept: GASTROENTEROLOGY | Facility: CLINIC | Age: 50
End: 2025-02-19
Payer: COMMERCIAL

## 2025-02-19 VITALS
DIASTOLIC BLOOD PRESSURE: 84 MMHG | BODY MASS INDEX: 36.64 KG/M2 | HEART RATE: 82 BPM | SYSTOLIC BLOOD PRESSURE: 144 MMHG | OXYGEN SATURATION: 99 % | TEMPERATURE: 97.4 F | WEIGHT: 228 LBS | HEIGHT: 66 IN

## 2025-02-19 DIAGNOSIS — Z12.11 ENCOUNTER FOR SCREENING FOR MALIGNANT NEOPLASM OF COLON: ICD-10-CM

## 2025-02-19 PROCEDURE — 99203 OFFICE O/P NEW LOW 30 MIN: CPT | Performed by: INTERNAL MEDICINE

## 2025-02-19 NOTE — LETTER
"2025     Christiane Curry DO  [address]  [address]  [place] [address]    Patient: Rizwana Little   YOB: 1975   Date of Visit: 2025       Dear Dr. Curry:    Thank you for referring Rizwana Little to me for evaluation. Below are my notes for this consultation.    If you have questions, please do not hesitate to call me. I look forward to following your patient along with you.         Sincerely,        Flex Quintero DO        CC: No Recipients    Flex Quintero DO  2025 10:57 AM  Signed  Name: Rizwana Little      : 1975      MRN: [id number]  Encounter Provider: Flex Quintero DO  Encounter Date: 2025   Encounter department: [facility] [place]  :  Assessment & Plan  Encounter for screening for malignant neoplasm of colon    Orders:  •  Ambulatory Referral to Gastroenterology  •  Colonoscopy; Future      History of Present Illness    Rizwana Little is a 49 y.o. female who presents to the office to schedule her first colonoscopy.  There is no family history for colon cancer or for colon polyp.  She denies abdominal pain, nausea, vomiting, diarrhea, rectal bleeding, melena, hematemesis, bloating, gaseousness, heartburn, dysphagia, odynophagia.  She does admit to occasional episodes of constipation.       Objective  /84 (BP Location: Right arm, Patient Position: Sitting, Cuff Size: Standard)   Pulse 82   Temp (!) 97.4 °F (36.3 °C) (Temporal)   Ht 5' 6\" (1.676 m)   Wt 103 kg (228 lb)   LMP 2025 (Approximate)   SpO2 99%   BMI 36.80 kg/m²      Physical Exam  Vitals reviewed.   Constitutional:       General: She is not in acute distress.     Appearance: Normal appearance. She is not ill-appearing.   Eyes:      General: No scleral icterus.     Extraocular Movements: Extraocular movements intact.      Pupils: Pupils are equal, round, and reactive to light.   Cardiovascular:      Rate and " Rhythm: Normal rate and regular rhythm.      Pulses: Normal pulses.      Heart sounds: Normal heart sounds. No murmur heard.  Pulmonary:      Effort: Pulmonary effort is normal.      Breath sounds: Normal breath sounds. No wheezing, rhonchi or rales.   Abdominal:      Palpations: Abdomen is soft. There is no mass.      Tenderness: There is no abdominal tenderness. There is no guarding or rebound.   Musculoskeletal:      Right lower leg: No edema.      Left lower leg: No edema.   Skin:     General: Skin is warm and dry.      Coloration: Skin is not jaundiced.      Findings: No rash.   Neurological:      General: No focal deficit present.      Mental Status: She is alert and oriented to person, place, and time.   Psychiatric:         Mood and Affect: Mood normal.         Behavior: Behavior normal.

## 2025-02-19 NOTE — PROGRESS NOTES
"Name: Rizwana Little      : 1975      MRN: [id number]  Encounter Provider: Flex Quintero DO  Encounter Date: 2025   Encounter department: [facility] [place]  :  Assessment & Plan  Encounter for screening for malignant neoplasm of colon    Orders:    Ambulatory Referral to Gastroenterology    Colonoscopy; Future      History of Present Illness     Rizwana Little is a 49 y.o. female who presents to the office to schedule her first colonoscopy.  There is no family history for colon cancer or for colon polyp.  She denies abdominal pain, nausea, vomiting, diarrhea, rectal bleeding, melena, hematemesis, bloating, gaseousness, heartburn, dysphagia, odynophagia.  She does admit to occasional episodes of constipation.       Objective   /84 (BP Location: Right arm, Patient Position: Sitting, Cuff Size: Standard)   Pulse 82   Temp (!) 97.4 °F (36.3 °C) (Temporal)   Ht 5' 6\" (1.676 m)   Wt 103 kg (228 lb)   LMP 2025 (Approximate)   SpO2 99%   BMI 36.80 kg/m²      Physical Exam  Vitals reviewed.   Constitutional:       General: She is not in acute distress.     Appearance: Normal appearance. She is not ill-appearing.   Eyes:      General: No scleral icterus.     Extraocular Movements: Extraocular movements intact.      Pupils: Pupils are equal, round, and reactive to light.   Cardiovascular:      Rate and Rhythm: Normal rate and regular rhythm.      Pulses: Normal pulses.      Heart sounds: Normal heart sounds. No murmur heard.  Pulmonary:      Effort: Pulmonary effort is normal.      Breath sounds: Normal breath sounds. No wheezing, rhonchi or rales.   Abdominal:      Palpations: Abdomen is soft. There is no mass.      Tenderness: There is no abdominal tenderness. There is no guarding or rebound.   Musculoskeletal:      Right lower leg: No edema.      Left lower leg: No edema.   Skin:     General: Skin is warm and dry.      Coloration: Skin is not " jaundiced.      Findings: No rash.   Neurological:      General: No focal deficit present.      Mental Status: She is alert and oriented to person, place, and time.   Psychiatric:         Mood and Affect: Mood normal.         Behavior: Behavior normal.

## 2025-02-19 NOTE — PATIENT INSTRUCTIONS
Scheduled date of colonoscopy (as of today):3/11/25  Physician performing colonoscopy:Ingrid  Location of colonoscopy:[place]  Bowel prep reviewed with patient:Noah/Miralax  Instructions reviewed with patient by:Ziggy pina  Clearances:  none

## 2025-03-10 DIAGNOSIS — Z87.42 HISTORY OF OVARIAN CYST: Primary | ICD-10-CM

## 2025-03-10 NOTE — TELEPHONE ENCOUNTER
Please let her know that I can not view the images from [facility], but I can see the notes- they did a CT scan and not an ultrasound, the CT scan report says MAY reflect an ovarian cyst, but that they need an ultrasound in order to better characterize. Ultrasound is the imaging of choice for ovaries and uterus. I can order her another ultrasound if that is what she prefers. This should be performed within out network so I can review the images

## 2025-03-11 ENCOUNTER — HOSPITAL ENCOUNTER (OUTPATIENT)
Dept: GASTROENTEROLOGY | Facility: HOSPITAL | Age: 50
Setting detail: OUTPATIENT SURGERY
Discharge: HOME | End: 2025-03-11
Attending: INTERNAL MEDICINE
Payer: COMMERCIAL

## 2025-03-11 ENCOUNTER — ANESTHESIA EVENT (OUTPATIENT)
Dept: GASTROENTEROLOGY | Facility: HOSPITAL | Age: 50
End: 2025-03-11
Payer: COMMERCIAL

## 2025-03-11 ENCOUNTER — ANESTHESIA (OUTPATIENT)
Dept: GASTROENTEROLOGY | Facility: HOSPITAL | Age: 50
End: 2025-03-11
Payer: COMMERCIAL

## 2025-03-11 VITALS
SYSTOLIC BLOOD PRESSURE: 128 MMHG | OXYGEN SATURATION: 97 % | TEMPERATURE: 97.5 F | DIASTOLIC BLOOD PRESSURE: 86 MMHG | BODY MASS INDEX: 36.6 KG/M2 | HEIGHT: 66 IN | WEIGHT: 227.74 LBS | HEART RATE: 67 BPM | RESPIRATION RATE: 20 BRPM

## 2025-03-11 DIAGNOSIS — Z12.11 ENCOUNTER FOR SCREENING FOR MALIGNANT NEOPLASM OF COLON: ICD-10-CM

## 2025-03-11 LAB
EXT PREGNANCY TEST URINE: NEGATIVE
EXT. CONTROL: NORMAL
GLUCOSE SERPL-MCNC: 157 MG/DL (ref 65–140)

## 2025-03-11 PROCEDURE — 81025 URINE PREGNANCY TEST: CPT | Performed by: ANESTHESIOLOGY

## 2025-03-11 PROCEDURE — 82948 REAGENT STRIP/BLOOD GLUCOSE: CPT

## 2025-03-11 PROCEDURE — G0121 COLON CA SCRN NOT HI RSK IND: HCPCS | Performed by: INTERNAL MEDICINE

## 2025-03-11 RX ORDER — PROPOFOL 10 MG/ML
INJECTION, EMULSION INTRAVENOUS AS NEEDED
Status: DISCONTINUED | OUTPATIENT
Start: 2025-03-11 | End: 2025-03-11

## 2025-03-11 RX ORDER — LIDOCAINE HYDROCHLORIDE 10 MG/ML
INJECTION, SOLUTION EPIDURAL; INFILTRATION; INTRACAUDAL; PERINEURAL AS NEEDED
Status: DISCONTINUED | OUTPATIENT
Start: 2025-03-11 | End: 2025-03-11

## 2025-03-11 RX ORDER — SODIUM CHLORIDE, SODIUM LACTATE, POTASSIUM CHLORIDE, CALCIUM CHLORIDE 600; 310; 30; 20 MG/100ML; MG/100ML; MG/100ML; MG/100ML
50 INJECTION, SOLUTION INTRAVENOUS CONTINUOUS
Status: DISCONTINUED | OUTPATIENT
Start: 2025-03-11 | End: 2025-03-15 | Stop reason: HOSPADM

## 2025-03-11 RX ADMIN — PROPOFOL 20 MG: 10 INJECTION, EMULSION INTRAVENOUS at 09:32

## 2025-03-11 RX ADMIN — SODIUM CHLORIDE, SODIUM LACTATE, POTASSIUM CHLORIDE, AND CALCIUM CHLORIDE 50 ML/HR: .6; .31; .03; .02 INJECTION, SOLUTION INTRAVENOUS at 08:46

## 2025-03-11 RX ADMIN — LIDOCAINE HYDROCHLORIDE 30 MG: 10 INJECTION, SOLUTION EPIDURAL; INFILTRATION; INTRACAUDAL; PERINEURAL at 09:25

## 2025-03-11 RX ADMIN — PROPOFOL 40 MG: 10 INJECTION, EMULSION INTRAVENOUS at 09:28

## 2025-03-11 RX ADMIN — PROPOFOL 40 MG: 10 INJECTION, EMULSION INTRAVENOUS at 09:30

## 2025-03-11 RX ADMIN — PROPOFOL 120 MG: 10 INJECTION, EMULSION INTRAVENOUS at 09:24

## 2025-03-11 RX ADMIN — LIDOCAINE HYDROCHLORIDE 20 MG: 10 INJECTION, SOLUTION EPIDURAL; INFILTRATION; INTRACAUDAL; PERINEURAL at 09:24

## 2025-03-11 RX ADMIN — PROPOFOL 40 MG: 10 INJECTION, EMULSION INTRAVENOUS at 09:26

## 2025-03-11 RX ADMIN — PROPOFOL 20 MG: 10 INJECTION, EMULSION INTRAVENOUS at 09:36

## 2025-03-11 RX ADMIN — PROPOFOL 20 MG: 10 INJECTION, EMULSION INTRAVENOUS at 09:34

## 2025-03-11 NOTE — H&P
"History and Physical -  [facility]  Rizwana Little 49 y.o. female MRN: [id number]      HPI: Rizwana Little is a 49 y.o. year old female who presents for screening colonoscopy      REVIEW OF SYSTEMS: Per the HPI, and otherwise unremarkable.    Historical Information   Past Medical History:   Diagnosis Date    Diabetes mellitus (HCC)     Elevated BP without diagnosis of hypertension 2024    gallbladder     Impaired fasting glucose 2022     Past Surgical History:   Procedure Laterality Date     SECTION      CHOLECYSTECTOMY      GALLBLADDER SURGERY       Social History   Social History     Substance and Sexual Activity   Alcohol Use Yes    Alcohol/week: 1.0 standard drink of alcohol    Types: 1 Glasses of wine per week    Comment: social     Social History     Substance and Sexual Activity   Drug Use Never     Social History     Tobacco Use   Smoking Status Never   Smokeless Tobacco Never     Family History   Problem Relation Age of Onset    Diabetes Sister     Diabetes Mother        Meds/Allergies     Not in a hospital admission.    No Known Allergies    Objective     Blood pressure 164/94, pulse 80, temperature 97.5 °F (36.4 °C), temperature source Temporal, resp. rate 16, height 5' 6\" (1.676 m), weight 103 kg (227 lb 11.8 oz), last menstrual period 2025, SpO2 97%.      PHYSICAL EXAM    Gen: NAD  CV: RRR  CHEST: Clear  ABD: soft, NT/ND  EXT: no edema      ASSESSMENT/PLAN:  This is a 49 y.o. year old female here for colonoscopy, and she is stable and optimized for her procedure.          "
21-Jun-2021 20:00

## 2025-03-11 NOTE — ANESTHESIA POSTPROCEDURE EVALUATION
Post-Op Assessment Note    CV Status:  Stable  Pain Score: 0    Pain management: adequate       Mental Status:  Alert and awake   Hydration Status:  Euvolemic   PONV Controlled:  Controlled   Airway Patency:  Patent     Post Op Vitals Reviewed: Yes    No anethesia notable event occurred.    Staff: CRNA           Last Filed PACU Vitals:  Vitals Value Taken Time   Temp 97.5 F    Pulse 86    /66    Resp 18    SpO2 97% RA        Modified Carlene:     Vitals Value Taken Time   Activity 2 03/11/25 1002   Respiration 2 03/11/25 1002   Circulation 2 03/11/25 1002   Consciousness 2 03/11/25 1002   Oxygen Saturation 2 03/11/25 1002     Modified Carlene Score: 10

## 2025-03-11 NOTE — ANESTHESIA PREPROCEDURE EVALUATION
Procedure:  COLONOSCOPY    Relevant Problems   ANESTHESIA (within normal limits)      CARDIO   (+) Primary hypertension      ENDO   (+) Type 2 diabetes mellitus without complication, without long-term current use of insulin (HCC)      GI/HEPATIC (within normal limits)      /RENAL (within normal limits)      GYN (within normal limits)      HEMATOLOGY (within normal limits)      MUSCULOSKELETAL (within normal limits)      NEURO/PSYCH (within normal limits)      PULMONARY (within normal limits)        Physical Exam    Airway    Mallampati score: III  TM Distance: >3 FB  Neck ROM: full     Dental   No notable dental hx     Cardiovascular  Cardiovascular exam normal    Pulmonary  Pulmonary exam normal     Other Findings  post-pubertal.      Anesthesia Plan  ASA Score- 2     Anesthesia Type- IV sedation with anesthesia with ASA Monitors.         Additional Monitors:     Airway Plan:            Plan Factors-Exercise tolerance (METS): >4 METS.    Chart reviewed.   Existing labs reviewed. Patient summary reviewed.    Patient is not a current smoker.              Induction- intravenous.    Postoperative Plan-     Perioperative Resuscitation Plan - Level 1 - Full Code.       Informed Consent- Anesthetic plan and risks discussed with patient.        NPO Status:  Vitals Value Taken Time   Date of last liquid 03/10/25 03/11/25 0808   Time of last liquid 2345 03/11/25 0808   Date of last solid 03/09/25 03/11/25 0808   Time of last solid 1800 03/11/25 0808       Discussed IV Sedation with General Anesthesia as backup with patient including but not limited to risk of cardiac insult, pulmonary complication, stroke, reaction to medications and death. All questions answered and consent was obtained.

## 2025-03-17 ENCOUNTER — HOSPITAL ENCOUNTER (OUTPATIENT)
Dept: ULTRASOUND IMAGING | Facility: CLINIC | Age: 50
Discharge: HOME | End: 2025-03-17
Payer: COMMERCIAL

## 2025-03-17 DIAGNOSIS — Z87.42 HISTORY OF OVARIAN CYST: ICD-10-CM

## 2025-03-17 PROCEDURE — 76856 US EXAM PELVIC COMPLETE: CPT

## 2025-03-17 PROCEDURE — 76830 TRANSVAGINAL US NON-OB: CPT

## 2025-03-18 DIAGNOSIS — R73.01 IMPAIRED FASTING GLUCOSE: ICD-10-CM

## 2025-03-19 RX ORDER — METFORMIN HYDROCHLORIDE 500 MG/1
500 TABLET, EXTENDED RELEASE ORAL 2 TIMES DAILY WITH MEALS
Qty: 180 TABLET | Refills: 1 | Status: SHIPPED | OUTPATIENT
Start: 2025-03-19

## 2025-03-21 ENCOUNTER — HOSPITAL ENCOUNTER (OUTPATIENT)
Age: 50
Discharge: HOME | End: 2025-03-21
Payer: COMMERCIAL

## 2025-03-21 VITALS — BODY MASS INDEX: 36.48 KG/M2 | WEIGHT: 227 LBS | HEIGHT: 66 IN

## 2025-03-21 DIAGNOSIS — Z12.31 ENCOUNTER FOR SCREENING MAMMOGRAM FOR BREAST CANCER: ICD-10-CM

## 2025-03-21 PROCEDURE — 77067 SCR MAMMO BI INCL CAD: CPT

## 2025-03-21 PROCEDURE — 77063 BREAST TOMOSYNTHESIS BI: CPT

## 2025-03-25 ENCOUNTER — RESULTS FOLLOW-UP (OUTPATIENT)
Age: 50
End: 2025-03-25

## 2025-03-26 ENCOUNTER — RESULTS FOLLOW-UP (OUTPATIENT)
Age: 50
End: 2025-03-26

## 2025-05-08 ENCOUNTER — OFFICE VISIT (OUTPATIENT)
Age: 50
End: 2025-05-08
Payer: COMMERCIAL

## 2025-05-08 ENCOUNTER — APPOINTMENT (OUTPATIENT)
Age: 50
End: 2025-05-08
Payer: COMMERCIAL

## 2025-05-08 VITALS
RESPIRATION RATE: 18 BRPM | HEIGHT: 66 IN | BODY MASS INDEX: 37.22 KG/M2 | OXYGEN SATURATION: 98 % | WEIGHT: 231.6 LBS | HEART RATE: 70 BPM | DIASTOLIC BLOOD PRESSURE: 86 MMHG | SYSTOLIC BLOOD PRESSURE: 128 MMHG | TEMPERATURE: 98 F

## 2025-05-08 DIAGNOSIS — I10 PRIMARY HYPERTENSION: ICD-10-CM

## 2025-05-08 DIAGNOSIS — D50.9 IRON DEFICIENCY ANEMIA, UNSPECIFIED IRON DEFICIENCY ANEMIA TYPE: ICD-10-CM

## 2025-05-08 DIAGNOSIS — E11.9 TYPE 2 DIABETES MELLITUS WITHOUT COMPLICATION, WITHOUT LONG-TERM CURRENT USE OF INSULIN (HCC): Primary | ICD-10-CM

## 2025-05-08 DIAGNOSIS — R53.81 MALAISE AND FATIGUE: Primary | ICD-10-CM

## 2025-05-08 DIAGNOSIS — G25.81 RESTLESS LEG SYNDROME: ICD-10-CM

## 2025-05-08 DIAGNOSIS — R53.83 MALAISE AND FATIGUE: Primary | ICD-10-CM

## 2025-05-08 DIAGNOSIS — E11.9 TYPE 2 DIABETES MELLITUS WITHOUT COMPLICATION, WITHOUT LONG-TERM CURRENT USE OF INSULIN (HCC): ICD-10-CM

## 2025-05-08 LAB
CHOLEST SERPL-MCNC: 146 MG/DL (ref ?–200)
HDLC SERPL-MCNC: 66 MG/DL
IRON SATN MFR SERPL: 11 % (ref 15–50)
IRON SERPL-MCNC: 53 UG/DL (ref 50–212)
LDLC SERPL CALC-MCNC: 62 MG/DL (ref 0–100)
SL AMB POCT HEMOGLOBIN AIC: 7.7 (ref ?–6.5)
TIBC SERPL-MCNC: 476 UG/DL (ref 250–450)
TRANSFERRIN SERPL-MCNC: 340 MG/DL (ref 203–362)
TRIGL SERPL-MCNC: 90 MG/DL (ref ?–150)
UIBC SERPL-MCNC: 423 UG/DL (ref 155–355)

## 2025-05-08 PROCEDURE — 83540 ASSAY OF IRON: CPT

## 2025-05-08 PROCEDURE — 80061 LIPID PANEL: CPT

## 2025-05-08 PROCEDURE — 83036 HEMOGLOBIN GLYCOSYLATED A1C: CPT | Performed by: STUDENT IN AN ORGANIZED HEALTH CARE EDUCATION/TRAINING PROGRAM

## 2025-05-08 PROCEDURE — 36415 COLL VENOUS BLD VENIPUNCTURE: CPT

## 2025-05-08 PROCEDURE — 99214 OFFICE O/P EST MOD 30 MIN: CPT | Performed by: STUDENT IN AN ORGANIZED HEALTH CARE EDUCATION/TRAINING PROGRAM

## 2025-05-08 PROCEDURE — 82728 ASSAY OF FERRITIN: CPT

## 2025-05-08 PROCEDURE — 84443 ASSAY THYROID STIM HORMONE: CPT

## 2025-05-08 PROCEDURE — 83550 IRON BINDING TEST: CPT

## 2025-05-08 RX ORDER — METFORMIN HYDROCHLORIDE 500 MG/1
1000 TABLET, EXTENDED RELEASE ORAL 2 TIMES DAILY WITH MEALS
Qty: 360 TABLET | Refills: 1 | Status: SHIPPED | OUTPATIENT
Start: 2025-05-08

## 2025-05-08 RX ORDER — GABAPENTIN 100 MG/1
100 CAPSULE ORAL
Qty: 30 CAPSULE | Refills: 1 | Status: SHIPPED | OUTPATIENT
Start: 2025-05-08

## 2025-05-08 NOTE — ASSESSMENT & PLAN NOTE
Lab Results   Component Value Date    HGBA1C 7.7 (A) 05/08/2025     A1c worse than previous. She was advised twice now since January to increase metformin to 1000 mg BID. She has only been taking 500 mg BID.  Increase metformin to 1000 mg BID  Continue lisinopril 10 mg qd  Continue crestor 5 mg qd    Orders:    TSH, 3rd generation with Free T4 reflex; Future    Lipid Panel with Direct LDL reflex; Future    metFORMIN (GLUCOPHAGE-XR) 500 mg 24 hr tablet; Take 2 tablets (1,000 mg total) by mouth 2 (two) times a day with meals    POCT hemoglobin A1c

## 2025-05-08 NOTE — PATIENT INSTRUCTIONS
Take 100 mg of gabapentin every night at bedtime. You can try as much as 200-300 mg at night, as long as it is not making you too sleepy during the day. Call/message me and let me know if you are doing better with the higher dose.

## 2025-05-08 NOTE — PROGRESS NOTES
Name: Rizwana Little      : 1975      MRN: [id number]  Encounter Provider: Tong Jaime MD  Encounter Date: 2025   Encounter department: [facility] [place]  :  Assessment & Plan  Type 2 diabetes mellitus without complication, without long-term current use of insulin (HCC)    Lab Results   Component Value Date    HGBA1C 7.7 (A) 2025     A1c worse than previous. She was advised twice now since January to increase metformin to 1000 mg BID. She has only been taking 500 mg BID.  Increase metformin to 1000 mg BID  Continue lisinopril 10 mg qd  Continue crestor 5 mg qd    Orders:    TSH, 3rd generation with Free T4 reflex; Future    Lipid Panel with Direct LDL reflex; Future    metFORMIN (GLUCOPHAGE-XR) 500 mg 24 hr tablet; Take 2 tablets (1,000 mg total) by mouth 2 (two) times a day with meals    POCT hemoglobin A1c    Restless leg syndrome  Patient has known iron deficiency and has been on supplements for last few months. TSH in October was WNL.  Recheck iron, TSH  Trial gabapentin 100 mg qhs - can uptitrate to 300 mg qhs if needed    Orders:    gabapentin (Neurontin) 100 mg capsule; Take 1 capsule (100 mg total) by mouth daily at bedtime    TSH, 3rd generation with Free T4 reflex; Future    Primary hypertension  BP well controlled. Continue lisinopril 10 mg qd.       Iron deficiency anemia, unspecified iron deficiency anemia type  Has been on iron supplements since January. Recheck iron panel.      Orders:    Iron Panel (Includes Ferritin, Iron Sat%, Iron, and TIBC); Future           History of Present Illness   Rizwana Little is a 50 yo F with PMH of HTN, T2DM who presents today for follow up. Unfortunately, she missed her sleep study yesterday as she showed up at the old location for the department. She was advised a few months ago to increase her metformin to 1000 mg BID but she still has been taking only 500 mg BID. She reports every night, she gets burning pain  "throughout her lower extremity that makes it hard to sleep. This goes away throughout the day. Whenever she walks, she reports her bilateral hands get swollen.        Review of Systems   Constitutional:  Negative for appetite change, chills and fever.   Eyes:  Negative for visual disturbance.   Respiratory:  Negative for cough and shortness of breath.    Cardiovascular:  Negative for chest pain and leg swelling.   Gastrointestinal:  Negative for abdominal pain, constipation, diarrhea and nausea.   Genitourinary:  Negative for difficulty urinating and dysuria.   Musculoskeletal:  Positive for joint swelling and myalgias. Negative for arthralgias.   Skin:  Negative for rash.   Neurological:  Negative for dizziness, weakness, light-headedness and headaches.       Objective   /86 (BP Location: Left arm, Patient Position: Sitting, Cuff Size: Large)   Pulse 70   Temp 98 °F (36.7 °C) (Tympanic)   Resp 18   Ht 5' 6\" (1.676 m)   Wt 105 kg (231 lb 9.6 oz)   SpO2 98%   BMI 37.38 kg/m²      Physical Exam  Constitutional:       General: She is not in acute distress.  Eyes:      Conjunctiva/sclera: Conjunctivae normal.   Cardiovascular:      Rate and Rhythm: Normal rate and regular rhythm.      Heart sounds: Normal heart sounds.   Pulmonary:      Effort: Pulmonary effort is normal.      Breath sounds: Normal breath sounds.   Abdominal:      General: There is no distension.      Tenderness: There is no abdominal tenderness.   Musculoskeletal:      Right lower leg: No edema.      Left lower leg: No edema.   Skin:     General: Skin is warm and dry.   Neurological:      Mental Status: She is alert.   Psychiatric:         Speech: Speech normal.         Behavior: Behavior normal. Behavior is cooperative.         "

## 2025-05-09 ENCOUNTER — RESULTS FOLLOW-UP (OUTPATIENT)
Age: 50
End: 2025-05-09

## 2025-05-09 LAB
FERRITIN SERPL-MCNC: 10 NG/ML (ref 30–307)
TSH SERPL DL<=0.05 MIU/L-ACNC: 1.67 UIU/ML (ref 0.45–4.5)

## 2025-05-09 NOTE — TELEPHONE ENCOUNTER
----- Message from Carmen Badillo PA-C sent at 5/9/2025  8:05 AM EDT -----  Iron levels are still mildly below normal, but improving compared to when they were last checked 4 months ago.  Please continue iron supplement as prescribed and follow-up with Dr. Jaime as scheduled.

## 2025-05-21 DIAGNOSIS — E66.812 CLASS 2 OBESITY DUE TO EXCESS CALORIES WITHOUT SERIOUS COMORBIDITY WITH BODY MASS INDEX (BMI) OF 37.0 TO 37.9 IN ADULT: Primary | ICD-10-CM

## 2025-05-21 DIAGNOSIS — M25.561 CHRONIC PAIN OF RIGHT KNEE: Primary | ICD-10-CM

## 2025-05-21 DIAGNOSIS — E66.09 CLASS 2 OBESITY DUE TO EXCESS CALORIES WITHOUT SERIOUS COMORBIDITY WITH BODY MASS INDEX (BMI) OF 37.0 TO 37.9 IN ADULT: Primary | ICD-10-CM

## 2025-05-21 DIAGNOSIS — G89.29 CHRONIC PAIN OF RIGHT KNEE: Primary | ICD-10-CM

## 2025-06-09 NOTE — PROGRESS NOTES
Assessment/Plan:    Obesity, Class II, BMI 35-39.9  - Patient is pursuing [facility]-Intensive Lifestyle Intervention Program, Very Low Calorie Diet-VLCD, Conservative Program, Edson-En-Y Gastric Bypass, and Vertical Sleeve Gastrectomy and follow up visits with medical weight management provider  - Initial weight loss goal of 5-10% weight loss for improved health. Weight loss can improve patient's co-morbid conditions and/or prevent weight-related complications.  - Explained the importance of continuing lifestyle changes in addition to any anti-obesity medications.   - Labs reviewed from 5/2025    General Recommendations:  Nutrition:  Eat breakfast daily.  Do not skip meals.      Food log (ie.) www.[employer].com, sparkpeople.com, loseit.com, [employer].com, etc.     Practice mindful eating.  Be sure to set aside time to eat, eat slowly, and savor your food.     Hydration:    At least 64oz of water daily.  No sugar sweetened beverages.  No juice (eat the fruit instead).     Exercise:  Studies have shown that the ideal exercise goal is somewhere between 150 to 300 minutes of moderate intensity exercise a week.  Start with exercising 10 minutes every other day and gradually increase physical activity with a goal of at least 150 minutes of moderate intensity exercise a week, divided over at least 3 days a week.  An example of this would be exercising 30 minutes a day, 5 days a week.  Resistance training can increase muscle mass and increase our resting metabolic rate.   FULL BODY resistance training is recommended 2-3 times a week.  Do not do this on consecutive days to allow for muscle recovery.     Aim for a bare minimum 5000 steps, even on days you do not exercise.     Monitoring:   Weigh yourself daily.  If this causes undue stress, then just weigh yourself once a week.  Weigh yourself the same time of the day with the same amount of clothing on.  Preferably this should be done after waking up, before you eat,  and with no clothing or minimal clothing on.     Specific Goals:  Calorie goal:  6999-1407 elena/day (Provided with meal plan to follow)  - Discussed her nutrition and lifestyle and she is encouraged to track her food.   -Increase her protein intake, discussed protein rich snacks and shakes.   -increase her physical activity.   -Discussed medications and she is more interested in pursuing surgery if she qualifies. But if she does not she would want to explore the GLP1s to help manage her weight and her HGBA1C.   -Discussed side effects of the GLp1s. Patient denies personal history of pancreatitis. Patient also denies personal and family history of medullary thyroid cancer and multiple endocrine neoplasia type 2 (MEN 2 tumor).          Rizwana was seen today for consult.    Diagnoses and all orders for this visit:    Obesity, Class II, BMI 35-39.9    Type 2 diabetes mellitus without complication, without long-term current use of insulin (HCC)    Primary hypertension    Other orders  -     Ambulatory Referral to Weight Management           Total time spent reviewing chart, interviewing patient, examining patient, discussing plan, answering all questions, and documentin min, with >50% face-to-face time spent counseling patient on nonsurgical interventions for the treatment of excess weight. Discussed in detail nonsurgical options including intensive lifestyle intervention program, very low-calorie diet program and conservative program.  Discussed the role of weight loss medications.  Counseled patient on diet behavior and exercise modification for weight loss.    Follow up in approximately 3 months with Non-Surgical Physician/Advanced Practitioner.    Subjective:   Chief Complaint   Patient presents with    Consult     Neck 15.5  Waist 45  SB        Patient ID: Rizwana Little  is a 49 y.o. female with excess weight/obesity here to pursue weight management.  Previous notes and records have been  reviewed.    Past Medical History[1]  Past Surgical History[2]    HPI:  Wt Readings from Last 20 Encounters:   06/10/25 103 kg (226 lb)   05/08/25 105 kg (231 lb 9.6 oz)   03/21/25 103 kg (227 lb)   03/11/25 103 kg (227 lb 11.8 oz)   02/26/25 103 kg (228 lb)   02/19/25 103 kg (228 lb)   02/05/25 104 kg (229 lb)   01/09/25 105 kg (231 lb 6.4 oz)   11/06/24 102 kg (225 lb)   09/06/24 103 kg (227 lb 12.8 oz)   08/05/24 103 kg (226 lb 12.8 oz)   07/18/22 102 kg (224 lb 9.6 oz)       Patient presents today to medical weight management office for consult. She has been recently concerned about her increasing HGBA1C and managing her weight. She states this is the heaviest she's ever been. She has tried going to the gym and counting calories and over the counter medication for weight loss and was unsuccessful in meaning weight loss.   Generally skips breakfast and will eat whatever she feels for lunch and dinner.     Starting MWM weight: 226 lbs   Starting BMI: 35  Starting Waist Measurement: 45 inches   Weight History  Highest adult weight:: (Patient-Rptd) (P) 225 lbs  Lowest adult weight:: (Patient-Rptd) (P) 200 lbs  What is your goal weight?: (Patient-Rptd) (P) 170 lbs  How long have you struggled with maintaining a healthy weight?: (Patient-Rptd) (P) Adult  What weight loss attempts have you had in the past?: (Patient-Rptd) (P) Diet, Exercise, Over the counter supplements  Which over the counter supplements have you tried?: (Patient-Rptd) (P) Pro Clinical HydroxyCut    Food Behaviors  Do you have any of the following food related behaviors?: (Patient-Rptd) (P) Cravings  Is there a trouble area of the day when these behaviors are more prominent?: (Patient-Rptd) (P) No    Food History  Provide the time that you typically eat and common foods you eat for each meal/snack: : (Patient-Rptd) (P) Lunch, Dinner, Snack(s)  How often do you go out to eat or have take out?: (Patient-Rptd) (P) 3 times a week  Daily beverage intake,  please select the beverages you consume daily and the amount(s):: (Patient-Rptd) (P) Water, Other  How many cups of water?: (Patient-Rptd) (P) 3  Please specify other beverage type and amount consumed daily:: (Patient-Rptd) (P) Sparkling Water and 4  Do you consume alcohol?: (Patient-Rptd) (P) Yes  How many drinks per week and what type of alcohol?: (Patient-Rptd) (P) Occasionally and Beer, Cocktails    Physical Activity   How often do you exercise?: (Patient-Rptd) (P) 3 times a week  How long are your activity sessions?: (Patient-Rptd) (P) 1 hour  What types of physical activity are you currently participating in?: (Patient-Rptd) (P) Cardio (elliptical, walking, running, biking, rowing, etc)    Sleep  How many hours of sleep are you getting per night?: (Patient-Rptd) (P) 5  How would you rate your quality of sleep?: (Patient-Rptd) (P) Poor  Do you have a history of sleep apnea?: (Patient-Rptd) (P) No    Family/Social History  Do you have a family history of obesity?: (Patient-Rptd) (P) No    Gynecological History  If of childbearing age, are you using birth control?: (Patient-Rptd) (P) No  Menopausal status?: (Patient-Rptd) (P) N/A     STOP ban/8        The following portions of the patient's history were reviewed and updated as appropriate: allergies, current medications, past family history, past medical history, past social history, past surgical history, and problem list.    Family History[3]     Review of Systems   Constitutional:  Negative for fatigue.   HENT:  Negative for sore throat.    Respiratory:  Negative for cough and shortness of breath.    Cardiovascular:  Negative for chest pain, palpitations and leg swelling.   Gastrointestinal:  Negative for abdominal pain, constipation, diarrhea, nausea and vomiting.   Genitourinary:  Negative for dysuria.   Musculoskeletal:  Negative for arthralgias and back pain.   Skin:  Negative for rash.   Neurological:  Negative for headaches.   Psychiatric/Behavioral:  " Negative for dysphoric mood. The patient is not nervous/anxious.        Objective:  /86 (BP Location: Left arm, Patient Position: Sitting, Cuff Size: Large)   Pulse 81   Ht 5' 6.5\" (1.689 m)   Wt 103 kg (226 lb)   LMP 05/30/2025 (Exact Date)   SpO2 97%   BMI 35.93 kg/m²     Physical Exam  Vitals and nursing note reviewed.   Constitutional:       Appearance: Normal appearance. She is obese.   HENT:      Head: Normocephalic.   Pulmonary:      Effort: Pulmonary effort is normal.     Neurological:      Mental Status: She is oriented to person, place, and time.     Psychiatric:         Mood and Affect: Mood normal.         Behavior: Behavior normal.         Thought Content: Thought content normal.         Judgment: Judgment normal.              Labs and Imaging  Recent labs and imaging have been personally reviewed.  Lab Results   Component Value Date    WBC 11.87 (H) 09/06/2024    HGB 10.7 (L) 09/06/2024    HCT 35.4 09/06/2024    MCV 84 09/06/2024     09/06/2024     Lab Results   Component Value Date    SODIUM 138 03/08/2025    K 3.7 03/08/2025    CL 99 (L) 03/08/2025    CO2 29 03/08/2025    AGAP 10 03/08/2025    BUN 11 03/08/2025    CREATININE 0.71 03/08/2025    GLUC 120 (H) 03/08/2025    GLUF 134 (H) 08/05/2024    CALCIUM 9.6 03/08/2025    AST 17 03/08/2025    ALT 18 03/08/2025    ALKPHOS 45 03/08/2025    TP 7.6 03/08/2025    TBILI 0.3 03/08/2025    EGFR 103 03/08/2025     Lab Results   Component Value Date    HGBA1C 7.7 (A) 05/08/2025     Lab Results   Component Value Date    IDB9QZZEWELO 1.670 05/08/2025     Lab Results   Component Value Date    CHOLESTEROL 146 05/08/2025     Lab Results   Component Value Date    HDL 66 05/08/2025     Lab Results   Component Value Date    TRIG 90 05/08/2025     Lab Results   Component Value Date    LDLCALC 62 05/08/2025          [1]   Past Medical History:  Diagnosis Date    Diabetes mellitus (HCC)     Elevated BP without diagnosis of hypertension 08/05/2024    " gallbladder     Impaired fasting glucose 2022   [2]   Past Surgical History:  Procedure Laterality Date     SECTION      CHOLECYSTECTOMY      GALLBLADDER SURGERY     [3]   Family History  Problem Relation Name Age of Onset    Diabetes Mother F

## 2025-06-10 ENCOUNTER — OFFICE VISIT (OUTPATIENT)
Dept: BARIATRICS | Facility: CLINIC | Age: 50
End: 2025-06-10
Attending: STUDENT IN AN ORGANIZED HEALTH CARE EDUCATION/TRAINING PROGRAM
Payer: COMMERCIAL

## 2025-06-10 VITALS
OXYGEN SATURATION: 97 % | BODY MASS INDEX: 35.47 KG/M2 | DIASTOLIC BLOOD PRESSURE: 86 MMHG | HEIGHT: 67 IN | SYSTOLIC BLOOD PRESSURE: 112 MMHG | HEART RATE: 81 BPM | WEIGHT: 226 LBS

## 2025-06-10 DIAGNOSIS — E11.9 TYPE 2 DIABETES MELLITUS WITHOUT COMPLICATION, WITHOUT LONG-TERM CURRENT USE OF INSULIN (HCC): ICD-10-CM

## 2025-06-10 DIAGNOSIS — I10 PRIMARY HYPERTENSION: ICD-10-CM

## 2025-06-10 DIAGNOSIS — E66.812 OBESITY, CLASS II, BMI 35-39.9: Primary | ICD-10-CM

## 2025-06-10 PROCEDURE — 99203 OFFICE O/P NEW LOW 30 MIN: CPT

## 2025-06-10 NOTE — ASSESSMENT & PLAN NOTE
- Patient is pursuing [facility]-Intensive Lifestyle Intervention Program, Very Low Calorie Diet-VLCD, Conservative Program, Edson-En-Y Gastric Bypass, and Vertical Sleeve Gastrectomy and follow up visits with medical weight management provider  - Initial weight loss goal of 5-10% weight loss for improved health. Weight loss can improve patient's co-morbid conditions and/or prevent weight-related complications.  - Explained the importance of continuing lifestyle changes in addition to any anti-obesity medications.   - Labs reviewed from 5/2025    General Recommendations:  Nutrition:  Eat breakfast daily.  Do not skip meals.      Food log (ie.) www.[employer].com, sparkpeople.com, loseit.com, [employer].com, etc.     Practice mindful eating.  Be sure to set aside time to eat, eat slowly, and savor your food.     Hydration:    At least 64oz of water daily.  No sugar sweetened beverages.  No juice (eat the fruit instead).     Exercise:  Studies have shown that the ideal exercise goal is somewhere between 150 to 300 minutes of moderate intensity exercise a week.  Start with exercising 10 minutes every other day and gradually increase physical activity with a goal of at least 150 minutes of moderate intensity exercise a week, divided over at least 3 days a week.  An example of this would be exercising 30 minutes a day, 5 days a week.  Resistance training can increase muscle mass and increase our resting metabolic rate.   FULL BODY resistance training is recommended 2-3 times a week.  Do not do this on consecutive days to allow for muscle recovery.     Aim for a bare minimum 5000 steps, even on days you do not exercise.     Monitoring:   Weigh yourself daily.  If this causes undue stress, then just weigh yourself once a week.  Weigh yourself the same time of the day with the same amount of clothing on.  Preferably this should be done after waking up, before you eat, and with no clothing or minimal clothing on.      Specific Goals:  Calorie goal:  4392-2411 elena/day (Provided with meal plan to follow)  - Discussed her nutrition and lifestyle and she is encouraged to track her food.   -Increase her protein intake, discussed protein rich snacks and shakes.   -increase her physical activity.   -Discussed medications and she is more interested in pursuing surgery if she qualifies. But if she does not she would want to explore the GLP1s to help manage her weight and her HGBA1C.   -Discussed side effects of the GLp1s. Patient denies personal history of pancreatitis. Patient also denies personal and family history of medullary thyroid cancer and multiple endocrine neoplasia type 2 (MEN 2 tumor).

## 2025-07-02 DIAGNOSIS — G25.81 RESTLESS LEG SYNDROME: ICD-10-CM

## 2025-07-02 RX ORDER — GABAPENTIN 100 MG/1
100 CAPSULE ORAL
Qty: 30 CAPSULE | Refills: 5 | Status: SHIPPED | OUTPATIENT
Start: 2025-07-02

## 2025-07-29 ENCOUNTER — OFFICE VISIT (OUTPATIENT)
Dept: BARIATRICS | Facility: CLINIC | Age: 50
End: 2025-07-29
Payer: COMMERCIAL

## 2025-07-29 VITALS
RESPIRATION RATE: 16 BRPM | HEART RATE: 82 BPM | HEIGHT: 67 IN | SYSTOLIC BLOOD PRESSURE: 140 MMHG | WEIGHT: 225 LBS | DIASTOLIC BLOOD PRESSURE: 96 MMHG | BODY MASS INDEX: 35.31 KG/M2

## 2025-07-29 DIAGNOSIS — K76.0 NAFLD (NONALCOHOLIC FATTY LIVER DISEASE): ICD-10-CM

## 2025-07-29 DIAGNOSIS — E11.9 TYPE 2 DIABETES MELLITUS WITHOUT COMPLICATION, WITHOUT LONG-TERM CURRENT USE OF INSULIN (HCC): ICD-10-CM

## 2025-07-29 DIAGNOSIS — E66.812 OBESITY, CLASS II, BMI 35-39.9: ICD-10-CM

## 2025-07-29 DIAGNOSIS — I10 PRIMARY HYPERTENSION: Primary | ICD-10-CM

## 2025-07-29 PROCEDURE — 99204 OFFICE O/P NEW MOD 45 MIN: CPT | Performed by: SURGERY

## 2025-07-30 ENCOUNTER — TELEPHONE (OUTPATIENT)
Age: 50
End: 2025-07-30

## 2025-07-30 DIAGNOSIS — E11.9 TYPE 2 DIABETES MELLITUS WITHOUT COMPLICATION, WITHOUT LONG-TERM CURRENT USE OF INSULIN (HCC): Primary | ICD-10-CM

## 2025-07-30 DIAGNOSIS — E66.812 CLASS 2 OBESITY DUE TO EXCESS CALORIES WITHOUT SERIOUS COMORBIDITY WITH BODY MASS INDEX (BMI) OF 37.0 TO 37.9 IN ADULT: ICD-10-CM

## 2025-07-30 DIAGNOSIS — E66.09 CLASS 2 OBESITY DUE TO EXCESS CALORIES WITHOUT SERIOUS COMORBIDITY WITH BODY MASS INDEX (BMI) OF 37.0 TO 37.9 IN ADULT: ICD-10-CM

## 2025-07-30 RX ORDER — TIRZEPATIDE 2.5 MG/.5ML
2.5 INJECTION, SOLUTION SUBCUTANEOUS WEEKLY
Qty: 2 ML | Refills: 0 | Status: SHIPPED | OUTPATIENT
Start: 2025-07-30

## 2025-07-31 ENCOUNTER — HOSPITAL ENCOUNTER (OUTPATIENT)
Dept: RADIOLOGY | Facility: HOSPITAL | Age: 50
Discharge: HOME | End: 2025-07-31
Payer: COMMERCIAL

## 2025-07-31 DIAGNOSIS — M25.561 CHRONIC PAIN OF RIGHT KNEE: ICD-10-CM

## 2025-07-31 DIAGNOSIS — G89.29 CHRONIC PAIN OF RIGHT KNEE: ICD-10-CM

## 2025-07-31 PROCEDURE — 73562 X-RAY EXAM OF KNEE 3: CPT

## 2025-08-01 ENCOUNTER — VBI (OUTPATIENT)
Dept: ADMINISTRATIVE | Facility: OTHER | Age: 50
End: 2025-08-01

## 2025-08-05 ENCOUNTER — RESULTS FOLLOW-UP (OUTPATIENT)
Age: 50
End: 2025-08-05

## 2025-08-06 DIAGNOSIS — D50.9 IRON DEFICIENCY ANEMIA, UNSPECIFIED IRON DEFICIENCY ANEMIA TYPE: ICD-10-CM

## 2025-08-08 RX ORDER — FERROUS SULFATE 324(65)MG
324 TABLET, DELAYED RELEASE (ENTERIC COATED) ORAL
Qty: 45 TABLET | Refills: 1 | Status: SHIPPED | OUTPATIENT
Start: 2025-08-08 | End: 2025-11-06

## 2025-08-11 ENCOUNTER — OFFICE VISIT (OUTPATIENT)
Age: 50
End: 2025-08-11
Payer: COMMERCIAL

## 2025-08-18 ENCOUNTER — APPOINTMENT (OUTPATIENT)
Age: 50
End: 2025-08-18
Payer: COMMERCIAL

## 2025-08-18 DIAGNOSIS — E11.9 TYPE 2 DIABETES MELLITUS WITHOUT COMPLICATION, WITHOUT LONG-TERM CURRENT USE OF INSULIN (HCC): ICD-10-CM

## 2025-08-18 DIAGNOSIS — Z88.9 MULTIPLE ALLERGIES: Primary | ICD-10-CM

## 2025-08-18 DIAGNOSIS — Z88.9 MULTIPLE ALLERGIES: ICD-10-CM

## 2025-08-18 DIAGNOSIS — R53.83 FATIGUE, UNSPECIFIED TYPE: ICD-10-CM

## 2025-08-18 PROCEDURE — 82306 VITAMIN D 25 HYDROXY: CPT

## 2025-08-18 PROCEDURE — 84443 ASSAY THYROID STIM HORMONE: CPT

## 2025-08-18 PROCEDURE — 83550 IRON BINDING TEST: CPT

## 2025-08-18 PROCEDURE — 83735 ASSAY OF MAGNESIUM: CPT

## 2025-08-18 PROCEDURE — 86001 ALLERGEN SPECIFIC IGG: CPT

## 2025-08-18 PROCEDURE — 83036 HEMOGLOBIN GLYCOSYLATED A1C: CPT

## 2025-08-18 PROCEDURE — 83540 ASSAY OF IRON: CPT

## 2025-08-18 PROCEDURE — 86003 ALLG SPEC IGE CRUDE XTRC EA: CPT

## 2025-08-18 PROCEDURE — 36415 COLL VENOUS BLD VENIPUNCTURE: CPT

## 2025-08-18 PROCEDURE — 82728 ASSAY OF FERRITIN: CPT

## 2025-08-18 PROCEDURE — 80048 BASIC METABOLIC PNL TOTAL CA: CPT

## 2025-08-18 PROCEDURE — 82785 ASSAY OF IGE: CPT

## 2025-08-19 LAB
25(OH)D3 SERPL-MCNC: 22 NG/ML (ref 30–100)
A ALTERNATA IGE QN: <0.1 KUA/I (ref 0–0.1)
A FUMIGATUS IGE QN: <0.1 KUA/I (ref 0–0.1)
ANION GAP SERPL CALCULATED.3IONS-SCNC: 12 MMOL/L (ref 4–13)
BERMUDA GRASS IGE QN: 0.83 KUA/I (ref 0–0.1)
BOXELDER IGE QN: 1.34 KUA/I (ref 0–0.1)
BUN SERPL-MCNC: 10 MG/DL (ref 5–25)
C HERBARUM IGE QN: <0.1 KUA/I (ref 0–0.1)
CALCIUM SERPL-MCNC: 9.7 MG/DL (ref 8.4–10.2)
CAT DANDER IGE QN: <0.1 KUA/I (ref 0–0.1)
CHLORIDE SERPL-SCNC: 101 MMOL/L (ref 96–108)
CMN PIGWEED IGE QN: <0.1 KUA/I (ref 0–0.1)
CO2 SERPL-SCNC: 25 MMOL/L (ref 21–32)
COMMON RAGWEED IGE QN: <0.1 KUA/I (ref 0–0.1)
COTTONWOOD IGE QN: <0.1 KUA/I (ref 0–0.1)
CREAT SERPL-MCNC: 0.7 MG/DL (ref 0.6–1.3)
D FARINAE IGE QN: <0.1 KUA/I (ref 0–0.1)
D PTERONYSS IGE QN: <0.1 KUA/I (ref 0–0.1)
DOG DANDER IGE QN: <0.1 KUA/I (ref 0–0.1)
EST. AVERAGE GLUCOSE BLD GHB EST-MCNC: 174 MG/DL
FERRITIN SERPL-MCNC: 21 NG/ML (ref 30–307)
GFR SERPL CREATININE-BSD FRML MDRD: 101 ML/MIN/1.73SQ M
GLUCOSE SERPL-MCNC: 151 MG/DL (ref 65–140)
HBA1C MFR BLD: 7.7 %
IRON SATN MFR SERPL: 12 % (ref 15–50)
IRON SERPL-MCNC: 56 UG/DL (ref 50–212)
LONDON PLANE IGE QN: <0.1 KUA/I (ref 0–0.1)
MAGNESIUM SERPL-MCNC: 1.9 MG/DL (ref 1.9–2.7)
MOUSE URINE PROT IGE QN: <0.1 KUA/I (ref 0–0.1)
MT JUNIPER IGE QN: <0.1 KUA/I (ref 0–0.1)
MUGWORT IGE QN: <0.1 KUA/I (ref 0–0.1)
P NOTATUM IGE QN: <0.1 KUA/I (ref 0–0.1)
POTASSIUM SERPL-SCNC: 3.5 MMOL/L (ref 3.5–5.3)
ROACH IGE QN: <0.1 KUA/I (ref 0–0.1)
SHEEP SORREL IGE QN: <0.1 KUA/I (ref 0–0.1)
SILVER BIRCH IGE QN: 2.77 KUA/I (ref 0–0.1)
SODIUM SERPL-SCNC: 138 MMOL/L (ref 135–147)
TIBC SERPL-MCNC: 485.8 UG/DL (ref 250–450)
TIMOTHY IGE QN: 1.89 KUA/I (ref 0–0.1)
TOTAL IGE SMQN RAST: 77.8 KU/L (ref 0–113)
TRANSFERRIN SERPL-MCNC: 347 MG/DL (ref 203–362)
TSH SERPL DL<=0.05 MIU/L-ACNC: 1.74 UIU/ML (ref 0.45–4.5)
UIBC SERPL-MCNC: 430 UG/DL (ref 155–355)
WALNUT IGE QN: <0.1 KUA/I (ref 0–0.1)
WHITE ASH IGE QN: 0.13 KUA/I (ref 0–0.1)
WHITE ELM IGE QN: <0.1 KUA/I (ref 0–0.1)
WHITE MULBERRY IGE QN: <0.1 KUA/I (ref 0–0.1)
WHITE OAK IGE QN: 2.24 KUA/I (ref 0–0.1)